# Patient Record
Sex: MALE | Race: OTHER | NOT HISPANIC OR LATINO | URBAN - METROPOLITAN AREA
[De-identification: names, ages, dates, MRNs, and addresses within clinical notes are randomized per-mention and may not be internally consistent; named-entity substitution may affect disease eponyms.]

---

## 2017-06-02 ENCOUNTER — EMERGENCY (EMERGENCY)
Facility: HOSPITAL | Age: 27
LOS: 1 days | Discharge: HOME | End: 2017-06-02
Admitting: EMERGENCY MEDICINE
Payer: COMMERCIAL

## 2017-06-02 VITALS
DIASTOLIC BLOOD PRESSURE: 86 MMHG | HEART RATE: 93 BPM | TEMPERATURE: 99 F | OXYGEN SATURATION: 97 % | RESPIRATION RATE: 18 BRPM | SYSTOLIC BLOOD PRESSURE: 164 MMHG

## 2017-06-02 PROCEDURE — 71120 X-RAY EXAM BREASTBONE 2/>VWS: CPT | Mod: 26

## 2017-06-02 PROCEDURE — 99284 EMERGENCY DEPT VISIT MOD MDM: CPT

## 2017-06-02 RX ORDER — DIAZEPAM 5 MG
1 TABLET ORAL
Qty: 8 | Refills: 0
Start: 2017-06-02

## 2017-06-02 RX ORDER — IBUPROFEN 200 MG
600 TABLET ORAL ONCE
Qty: 0 | Refills: 0 | Status: COMPLETED | OUTPATIENT
Start: 2017-06-02 | End: 2017-06-02

## 2017-06-02 RX ORDER — IBUPROFEN 200 MG
1 TABLET ORAL
Qty: 20 | Refills: 0
Start: 2017-06-02

## 2017-06-02 RX ADMIN — Medication 600 MILLIGRAM(S): at 21:51
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
## 2017-06-02 NOTE — ED PROVIDER NOTE - PLAN OF CARE
Follow up with your PMD within 48-72hrs. Take all of your medications as previously prescribed.  Apply warm compresses to your neck 2-3 times/day.  Light movements, no heavy lifting.  Take Motrin 600mg every 6-8 hrs with food for pain. Take Valium 5 mg 1 tab every 8 hrs only as needed for muscle spasm- caution drowsiness- do not drive.  You need to follow up with an Orthopedist Doctor outpatient for a possible MRI of your neck vs. Physical Therapy- referral list provided. Worsening or new weakness, pain, numbness, fever, chills or new concerning symptoms return to the Emergency Department.
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
## 2017-06-02 NOTE — ED PROVIDER NOTE - PROGRESS NOTE DETAILS
PA Tiberio- feeling better s/p motrin and valium. Xray sterum negative for acute fracture/displacement. Will DC home on Motrin with PMD follow up. The scribe's documentation has been prepared under my direction and personally reviewed by me in its entirety. I confirm that the note above accurately reflects all work, treatment, procedures, and medical decision making performed by me.  Kiara Samano PA-C

## 2017-06-02 NOTE — ED PROVIDER NOTE - MUSCULOSKELETAL MINIMAL EXAM
Mid sternal chest wall tenderness with palpation. mild mid sternal chest wall tenderness with palpation.

## 2021-01-24 ENCOUNTER — INPATIENT (INPATIENT)
Facility: HOSPITAL | Age: 31
LOS: 0 days | Discharge: HOME | End: 2021-01-25
Attending: SURGERY | Admitting: SURGERY
Payer: MEDICAID

## 2021-01-24 VITALS
RESPIRATION RATE: 20 BRPM | DIASTOLIC BLOOD PRESSURE: 97 MMHG | TEMPERATURE: 98 F | HEART RATE: 57 BPM | OXYGEN SATURATION: 100 % | SYSTOLIC BLOOD PRESSURE: 156 MMHG

## 2021-01-24 DIAGNOSIS — K81.9 CHOLECYSTITIS, UNSPECIFIED: ICD-10-CM

## 2021-01-24 LAB
ALBUMIN SERPL ELPH-MCNC: 5 G/DL — SIGNIFICANT CHANGE UP (ref 3.3–5)
ALP SERPL-CCNC: 69 U/L — SIGNIFICANT CHANGE UP (ref 40–120)
ALT FLD-CCNC: 38 U/L — SIGNIFICANT CHANGE UP (ref 4–41)
ANION GAP SERPL CALC-SCNC: 14 MMOL/L — SIGNIFICANT CHANGE UP (ref 7–14)
APTT BLD: 29.2 SEC — SIGNIFICANT CHANGE UP (ref 27–36.3)
AST SERPL-CCNC: 29 U/L — SIGNIFICANT CHANGE UP (ref 4–40)
BASOPHILS # BLD AUTO: 0.01 K/UL — SIGNIFICANT CHANGE UP (ref 0–0.2)
BASOPHILS NFR BLD AUTO: 0.1 % — SIGNIFICANT CHANGE UP (ref 0–2)
BILIRUB SERPL-MCNC: 0.4 MG/DL — SIGNIFICANT CHANGE UP (ref 0.2–1.2)
BLD GP AB SCN SERPL QL: NEGATIVE — SIGNIFICANT CHANGE UP
BUN SERPL-MCNC: 14 MG/DL — SIGNIFICANT CHANGE UP (ref 7–23)
CALCIUM SERPL-MCNC: 9.6 MG/DL — SIGNIFICANT CHANGE UP (ref 8.4–10.5)
CHLORIDE SERPL-SCNC: 103 MMOL/L — SIGNIFICANT CHANGE UP (ref 98–107)
CO2 SERPL-SCNC: 22 MMOL/L — SIGNIFICANT CHANGE UP (ref 22–31)
CREAT SERPL-MCNC: 0.92 MG/DL — SIGNIFICANT CHANGE UP (ref 0.5–1.3)
EOSINOPHIL # BLD AUTO: 0.02 K/UL — SIGNIFICANT CHANGE UP (ref 0–0.5)
EOSINOPHIL NFR BLD AUTO: 0.2 % — SIGNIFICANT CHANGE UP (ref 0–6)
GLUCOSE SERPL-MCNC: 134 MG/DL — HIGH (ref 70–99)
HCT VFR BLD CALC: 44.3 % — SIGNIFICANT CHANGE UP (ref 39–50)
HGB BLD-MCNC: 14.3 G/DL — SIGNIFICANT CHANGE UP (ref 13–17)
IANC: 9.14 K/UL — HIGH (ref 1.5–8.5)
IMM GRANULOCYTES NFR BLD AUTO: 0.3 % — SIGNIFICANT CHANGE UP (ref 0–1.5)
INR BLD: 1.03 RATIO — SIGNIFICANT CHANGE UP (ref 0.88–1.16)
LIDOCAIN IGE QN: 27 U/L — SIGNIFICANT CHANGE UP (ref 7–60)
LYMPHOCYTES # BLD AUTO: 0.96 K/UL — LOW (ref 1–3.3)
LYMPHOCYTES # BLD AUTO: 9 % — LOW (ref 13–44)
MCHC RBC-ENTMCNC: 28 PG — SIGNIFICANT CHANGE UP (ref 27–34)
MCHC RBC-ENTMCNC: 32.3 GM/DL — SIGNIFICANT CHANGE UP (ref 32–36)
MCV RBC AUTO: 86.7 FL — SIGNIFICANT CHANGE UP (ref 80–100)
MONOCYTES # BLD AUTO: 0.5 K/UL — SIGNIFICANT CHANGE UP (ref 0–0.9)
MONOCYTES NFR BLD AUTO: 4.7 % — SIGNIFICANT CHANGE UP (ref 2–14)
NEUTROPHILS # BLD AUTO: 9.14 K/UL — HIGH (ref 1.8–7.4)
NEUTROPHILS NFR BLD AUTO: 85.7 % — HIGH (ref 43–77)
NRBC # BLD: 0 /100 WBCS — SIGNIFICANT CHANGE UP
NRBC # FLD: 0 K/UL — SIGNIFICANT CHANGE UP
PLATELET # BLD AUTO: 224 K/UL — SIGNIFICANT CHANGE UP (ref 150–400)
POTASSIUM SERPL-MCNC: 3.4 MMOL/L — LOW (ref 3.5–5.3)
POTASSIUM SERPL-SCNC: 3.4 MMOL/L — LOW (ref 3.5–5.3)
PROT SERPL-MCNC: 7.4 G/DL — SIGNIFICANT CHANGE UP (ref 6–8.3)
PROTHROM AB SERPL-ACNC: 11.8 SEC — SIGNIFICANT CHANGE UP (ref 10.6–13.6)
RBC # BLD: 5.11 M/UL — SIGNIFICANT CHANGE UP (ref 4.2–5.8)
RBC # FLD: 12.7 % — SIGNIFICANT CHANGE UP (ref 10.3–14.5)
RH IG SCN BLD-IMP: POSITIVE — SIGNIFICANT CHANGE UP
SARS-COV-2 RNA SPEC QL NAA+PROBE: DETECTED
SODIUM SERPL-SCNC: 139 MMOL/L — SIGNIFICANT CHANGE UP (ref 135–145)
WBC # BLD: 10.66 K/UL — HIGH (ref 3.8–10.5)
WBC # FLD AUTO: 10.66 K/UL — HIGH (ref 3.8–10.5)

## 2021-01-24 PROCEDURE — 99222 1ST HOSP IP/OBS MODERATE 55: CPT | Mod: GC

## 2021-01-24 PROCEDURE — 93010 ELECTROCARDIOGRAM REPORT: CPT

## 2021-01-24 PROCEDURE — 76705 ECHO EXAM OF ABDOMEN: CPT | Mod: 26

## 2021-01-24 PROCEDURE — 99285 EMERGENCY DEPT VISIT HI MDM: CPT

## 2021-01-24 PROCEDURE — 99231 SBSQ HOSP IP/OBS SF/LOW 25: CPT

## 2021-01-24 RX ORDER — SODIUM CHLORIDE 9 MG/ML
1000 INJECTION INTRAMUSCULAR; INTRAVENOUS; SUBCUTANEOUS ONCE
Refills: 0 | Status: COMPLETED | OUTPATIENT
Start: 2021-01-24 | End: 2021-01-24

## 2021-01-24 RX ORDER — POTASSIUM CHLORIDE 20 MEQ
40 PACKET (EA) ORAL ONCE
Refills: 0 | Status: COMPLETED | OUTPATIENT
Start: 2021-01-24 | End: 2021-01-24

## 2021-01-24 RX ORDER — MORPHINE SULFATE 50 MG/1
4 CAPSULE, EXTENDED RELEASE ORAL ONCE
Refills: 0 | Status: DISCONTINUED | OUTPATIENT
Start: 2021-01-24 | End: 2021-01-24

## 2021-01-24 RX ORDER — METRONIDAZOLE 500 MG
500 TABLET ORAL EVERY 8 HOURS
Refills: 0 | Status: DISCONTINUED | OUTPATIENT
Start: 2021-01-24 | End: 2021-01-25

## 2021-01-24 RX ORDER — SODIUM CHLORIDE 9 MG/ML
1000 INJECTION, SOLUTION INTRAVENOUS
Refills: 0 | Status: DISCONTINUED | OUTPATIENT
Start: 2021-01-24 | End: 2021-01-25

## 2021-01-24 RX ORDER — ONDANSETRON 8 MG/1
4 TABLET, FILM COATED ORAL ONCE
Refills: 0 | Status: COMPLETED | OUTPATIENT
Start: 2021-01-24 | End: 2021-01-24

## 2021-01-24 RX ORDER — HYDROMORPHONE HYDROCHLORIDE 2 MG/ML
1 INJECTION INTRAMUSCULAR; INTRAVENOUS; SUBCUTANEOUS EVERY 4 HOURS
Refills: 0 | Status: DISCONTINUED | OUTPATIENT
Start: 2021-01-24 | End: 2021-01-25

## 2021-01-24 RX ORDER — METRONIDAZOLE 500 MG
500 TABLET ORAL ONCE
Refills: 0 | Status: COMPLETED | OUTPATIENT
Start: 2021-01-24 | End: 2021-01-24

## 2021-01-24 RX ORDER — SODIUM CHLORIDE 9 MG/ML
1000 INJECTION, SOLUTION INTRAVENOUS ONCE
Refills: 0 | Status: COMPLETED | OUTPATIENT
Start: 2021-01-24 | End: 2021-01-24

## 2021-01-24 RX ORDER — ACETAMINOPHEN 500 MG
975 TABLET ORAL EVERY 6 HOURS
Refills: 0 | Status: DISCONTINUED | OUTPATIENT
Start: 2021-01-24 | End: 2021-01-25

## 2021-01-24 RX ORDER — HYDROMORPHONE HYDROCHLORIDE 2 MG/ML
0.5 INJECTION INTRAMUSCULAR; INTRAVENOUS; SUBCUTANEOUS EVERY 4 HOURS
Refills: 0 | Status: DISCONTINUED | OUTPATIENT
Start: 2021-01-24 | End: 2021-01-25

## 2021-01-24 RX ORDER — ENOXAPARIN SODIUM 100 MG/ML
40 INJECTION SUBCUTANEOUS DAILY
Refills: 0 | Status: DISCONTINUED | OUTPATIENT
Start: 2021-01-24 | End: 2021-01-25

## 2021-01-24 RX ORDER — CEFTRIAXONE 500 MG/1
1000 INJECTION, POWDER, FOR SOLUTION INTRAMUSCULAR; INTRAVENOUS EVERY 24 HOURS
Refills: 0 | Status: DISCONTINUED | OUTPATIENT
Start: 2021-01-24 | End: 2021-01-25

## 2021-01-24 RX ORDER — METRONIDAZOLE 500 MG
TABLET ORAL
Refills: 0 | Status: DISCONTINUED | OUTPATIENT
Start: 2021-01-24 | End: 2021-01-25

## 2021-01-24 RX ADMIN — Medication 40 MILLIEQUIVALENT(S): at 13:26

## 2021-01-24 RX ADMIN — SODIUM CHLORIDE 1000 MILLILITER(S): 9 INJECTION INTRAMUSCULAR; INTRAVENOUS; SUBCUTANEOUS at 11:44

## 2021-01-24 RX ADMIN — Medication 100 MILLIGRAM(S): at 22:41

## 2021-01-24 RX ADMIN — CEFTRIAXONE 100 MILLIGRAM(S): 500 INJECTION, POWDER, FOR SOLUTION INTRAMUSCULAR; INTRAVENOUS at 15:40

## 2021-01-24 RX ADMIN — SODIUM CHLORIDE 1000 MILLILITER(S): 9 INJECTION, SOLUTION INTRAVENOUS at 15:37

## 2021-01-24 RX ADMIN — MORPHINE SULFATE 4 MILLIGRAM(S): 50 CAPSULE, EXTENDED RELEASE ORAL at 13:51

## 2021-01-24 RX ADMIN — SODIUM CHLORIDE 125 MILLILITER(S): 9 INJECTION, SOLUTION INTRAVENOUS at 17:25

## 2021-01-24 RX ADMIN — ONDANSETRON 4 MILLIGRAM(S): 8 TABLET, FILM COATED ORAL at 18:19

## 2021-01-24 RX ADMIN — MORPHINE SULFATE 4 MILLIGRAM(S): 50 CAPSULE, EXTENDED RELEASE ORAL at 11:43

## 2021-01-24 RX ADMIN — HYDROMORPHONE HYDROCHLORIDE 1 MILLIGRAM(S): 2 INJECTION INTRAMUSCULAR; INTRAVENOUS; SUBCUTANEOUS at 16:08

## 2021-01-24 RX ADMIN — Medication 100 MILLIGRAM(S): at 16:56

## 2021-01-24 RX ADMIN — ENOXAPARIN SODIUM 40 MILLIGRAM(S): 100 INJECTION SUBCUTANEOUS at 19:30

## 2021-01-24 NOTE — H&P ADULT - ATTENDING COMMENTS
Patient has tested positive for COVID.  Given equivocal findings for acute cholecystitis on ultrasound, will defer operative management at present time.

## 2021-01-24 NOTE — ED PROVIDER NOTE - OBJECTIVE STATEMENT
29yo M no PMHx p/w abd pain onset 1AM, constant in nature, radiation to the back. Took two unknown doses of Advil at night without relief of sxs. Has a history of cholelithiasis. No N/V/D. Last ate at 11pm. No diarrhea. Last BM this AM, nonbloody, nonmelanotic. No fevers, chills or night sweats. No prior surgeries or medical issues. No meds or alcohol use.

## 2021-01-24 NOTE — H&P ADULT - HISTORY OF PRESENT ILLNESS
30M hx biliary colic otherwise healthy presents with 1 day of abdominal pain. Patient reports he has known gallstones and biliary colic for 3 years but never saw a surgeon. Yesterday he had a rich meal and woke up at 1 am with excruciating abdominal pain which worsened. At 3 am patient vomited. Due to worsening abdominal pain, he presented to the ED for further evaluation. Denies fevers/chills, prior surgeries.

## 2021-01-24 NOTE — ED ADULT NURSE REASSESSMENT NOTE - NS ED NURSE REASSESS COMMENT FT1
Patient awake and alert, c/o nausea when attempting to get up to go to the BR. Zofran given as ordered, will continue to monitor. Patient otherwise resting in no distress.

## 2021-01-24 NOTE — ED PROVIDER NOTE - PHYSICAL EXAMINATION
CONSTITUTIONAL: Writhing in pain, punching the wall, screaming for pain medications   EYES: EOMI, conjunctiva and sclera clear  ENMT: MMM, no pharyngeal injection or exudates   NECK: Supple   RESPIRATORY: Normal respiratory effort, CTAB, no wheezes, rales, or rhonchi  CARDIOVASCULAR: RRR, normal S1 and S2, no MRG, distal pulses 2+ bilaterally,no peripheral edema  ABDOMEN: Soft, non-distended, TTP over RUQ and epigastric region, no rebound/guarding  MUSCULOSKELETAL: No joint swelling or tenderness  NEURO: following commands, moving all extremities spontaneously  PSYCH: appropriate affect   SKIN: No rashes; no palpable lesions

## 2021-01-24 NOTE — ED PROVIDER NOTE - CLINICAL SUMMARY MEDICAL DECISION MAKING FREE TEXT BOX
31yo M PMHx cholelithiasis p/w abd pain onset 1AM, constant in nature, radiation to the back c/f cholecystitis vs choledocholithiasis vs pancreatitis. Vitals wnl. Will obtain RUQ US, basic blood work, lipase, give IVF, morphine and reassess.

## 2021-01-24 NOTE — ED ADULT NURSE REASSESSMENT NOTE - NS ED NURSE REASSESS COMMENT FT1
Pt being medicated per MD orders, pt admitted under surgery at this time. Pt rates a decrease in pain 4/4. Respirations non-labored. Pt with 20 G IV to b/l AC. Site is clean dry and intact. Safety being maintained. Will continue to monitor.

## 2021-01-24 NOTE — ED PROVIDER NOTE - ATTENDING CONTRIBUTION TO CARE
Bonifacio Bains Attending Physician: Agree w/ above written by resident unless explicitly outline here. 31 y/o male hx of gall stones, last US 5 years ago, presents w/ few hours of right upper quadrant pain. Per patient, been having pain since 1 am this morning - pain in epigastric/RUQ area, radiating to the back - feels like his prior gall stone pain - no dysuria, hematuria - no hx of renal colic - no testicular pain - patient took 2x aleve w/ mild relief of pain - nausea w/ 1x vomiting today - on exam, patient very uncomfortable - abd is soft, not peritoneal, w/ positive murphys and tenderness w/ some guarding in RUQ - no CVAT - given presentation, concern for acute jeramy vs symptomatic cholelithiasis - no fevers, not a drinker, but does smoke marijuana - will obtain labs, lipase, US, pain control, and dispo accordingly.

## 2021-01-24 NOTE — ED ADULT TRIAGE NOTE - CHIEF COMPLAINT QUOTE
pt coming with Epigastric pain started last night, + nausea, pt stated was Dx. with Gall stones in the past,

## 2021-01-24 NOTE — H&P ADULT - NSHPPHYSICALEXAM_GEN_ALL_CORE
NAD, resting in stretcher  Alert, responding appropriately  Non labored respirations  Soft, RUQ tenderness, ND, voluntary guarding, +Ramirez's  WWP

## 2021-01-24 NOTE — H&P ADULT - NSHPLABSRESULTS_GEN_ALL_CORE
14.3   10.66 )-----------( 224      ( 24 Jan 2021 12:18 )             44.3     01-24    139  |  103  |  14  ----------------------------<  134<H>  3.4<L>   |  22  |  0.92    Ca    9.6      24 Jan 2021 12:18    TPro  7.4  /  Alb  5.0  /  TBili  0.4  /  DBili  x   /  AST  29  /  ALT  38  /  AlkPhos  69  01-24    < from: US Abdomen Limited (01.24.21 @ 12:59) >      EXAM:  US ABDOMEN LIMITED        PROCEDURE DATE:  Jan 24 2021         INTERPRETATION:  CLINICAL INFORMATION: Right upper quadrant pain    COMPARISON: Sonogram abdomen from 10/10/2016.    TECHNIQUE: Sonography of the right upper quadrant.    FINDINGS:    Liver: Slightly increased echogenicity may suggest hepatic steatosis.  Bile ducts: Normal caliber. Common bile duct measures 3 mm.  Gallbladder: Multiple gallstones, some of which are nonmobile and located in the neck. Gallbladder wall measures approximately 3 mm. No pericholecystic fluid. Positive sonographic Ramirez sign.  Pancreas: Visualized portions are within normal limits.  Right kidney: 11.9 cm. No hydronephrosis.  Ascites: None.  Aorta, IVC: Visualized portions are within normal limits.    IMPRESSION:  Cholelithiasis and associated positive sonographic Ramirez's sign, without significant wall thickening or pericholecystic fluid. Findings are suspicious, but not diagnostic, for acute cholecystitis. Further evaluation may be obtained with HIDA nuclear medicine scan.    SANTIAGO VAZQUEZ MD; Resident Radiology  This document has been electronically signed.  CHELSEY SEALS MD; Attending Radiologist  This document has been electronically signed. Jan 24 2021  1:01PM    < end of copied text >

## 2021-01-24 NOTE — H&P ADULT - ASSESSMENT
30M hx biliary colic now presenting with 1 day of abdominal pain found to have acute cholecystitis    - Admit to Surgery under Dr. Urbina  - Booked and consented for laparoscopic cholecystectomy  - NPO / IV hydration / Ceftriaxone and Flagyl  - Preoperative studies  - Pain control as needed  - VTE ppx: Lovenox, ambulation    d/w Dr. Carlitos Brady, PGY-3  Acute Care Surgery (B Team)  [id number] with questions

## 2021-01-25 ENCOUNTER — TRANSCRIPTION ENCOUNTER (OUTPATIENT)
Age: 31
End: 2021-01-25

## 2021-01-25 VITALS
TEMPERATURE: 98 F | SYSTOLIC BLOOD PRESSURE: 145 MMHG | OXYGEN SATURATION: 99 % | DIASTOLIC BLOOD PRESSURE: 82 MMHG | HEART RATE: 75 BPM | RESPIRATION RATE: 18 BRPM

## 2021-01-25 LAB
ANION GAP SERPL CALC-SCNC: 11 MMOL/L — SIGNIFICANT CHANGE UP (ref 7–14)
BUN SERPL-MCNC: 9 MG/DL — SIGNIFICANT CHANGE UP (ref 7–23)
CALCIUM SERPL-MCNC: 9.1 MG/DL — SIGNIFICANT CHANGE UP (ref 8.4–10.5)
CHLORIDE SERPL-SCNC: 106 MMOL/L — SIGNIFICANT CHANGE UP (ref 98–107)
CO2 SERPL-SCNC: 25 MMOL/L — SIGNIFICANT CHANGE UP (ref 22–31)
CREAT SERPL-MCNC: 0.93 MG/DL — SIGNIFICANT CHANGE UP (ref 0.5–1.3)
GLUCOSE SERPL-MCNC: 102 MG/DL — HIGH (ref 70–99)
HCT VFR BLD CALC: 44.5 % — SIGNIFICANT CHANGE UP (ref 39–50)
HGB BLD-MCNC: 14.4 G/DL — SIGNIFICANT CHANGE UP (ref 13–17)
MAGNESIUM SERPL-MCNC: 2.2 MG/DL — SIGNIFICANT CHANGE UP (ref 1.6–2.6)
MCHC RBC-ENTMCNC: 28.2 PG — SIGNIFICANT CHANGE UP (ref 27–34)
MCHC RBC-ENTMCNC: 32.4 GM/DL — SIGNIFICANT CHANGE UP (ref 32–36)
MCV RBC AUTO: 87.1 FL — SIGNIFICANT CHANGE UP (ref 80–100)
NRBC # BLD: 0 /100 WBCS — SIGNIFICANT CHANGE UP
NRBC # FLD: 0 K/UL — SIGNIFICANT CHANGE UP
PHOSPHATE SERPL-MCNC: 2.4 MG/DL — LOW (ref 2.5–4.5)
PLATELET # BLD AUTO: 212 K/UL — SIGNIFICANT CHANGE UP (ref 150–400)
POTASSIUM SERPL-MCNC: 4.1 MMOL/L — SIGNIFICANT CHANGE UP (ref 3.5–5.3)
POTASSIUM SERPL-SCNC: 4.1 MMOL/L — SIGNIFICANT CHANGE UP (ref 3.5–5.3)
RBC # BLD: 5.11 M/UL — SIGNIFICANT CHANGE UP (ref 4.2–5.8)
RBC # FLD: 12.8 % — SIGNIFICANT CHANGE UP (ref 10.3–14.5)
SODIUM SERPL-SCNC: 142 MMOL/L — SIGNIFICANT CHANGE UP (ref 135–145)
WBC # BLD: 8.75 K/UL — SIGNIFICANT CHANGE UP (ref 3.8–10.5)
WBC # FLD AUTO: 8.75 K/UL — SIGNIFICANT CHANGE UP (ref 3.8–10.5)

## 2021-01-25 RX ORDER — OXYCODONE HYDROCHLORIDE 5 MG/1
5 TABLET ORAL EVERY 4 HOURS
Refills: 0 | Status: DISCONTINUED | OUTPATIENT
Start: 2021-01-25 | End: 2021-01-25

## 2021-01-25 RX ORDER — ACETAMINOPHEN 500 MG
2 TABLET ORAL
Qty: 0 | Refills: 0 | DISCHARGE
Start: 2021-01-25

## 2021-01-25 RX ORDER — OXYCODONE HYDROCHLORIDE 5 MG/1
1 TABLET ORAL
Qty: 8 | Refills: 0
Start: 2021-01-25 | End: 2021-01-26

## 2021-01-25 RX ORDER — INFLUENZA VIRUS VACCINE 15; 15; 15; 15 UG/.5ML; UG/.5ML; UG/.5ML; UG/.5ML
0.5 SUSPENSION INTRAMUSCULAR ONCE
Refills: 0 | Status: DISCONTINUED | OUTPATIENT
Start: 2021-01-25 | End: 2021-01-25

## 2021-01-25 RX ORDER — ACETAMINOPHEN 500 MG
3 TABLET ORAL
Qty: 0 | Refills: 0 | DISCHARGE
Start: 2021-01-25

## 2021-01-25 RX ORDER — OXYCODONE HYDROCHLORIDE 5 MG/1
10 TABLET ORAL EVERY 4 HOURS
Refills: 0 | Status: DISCONTINUED | OUTPATIENT
Start: 2021-01-25 | End: 2021-01-25

## 2021-01-25 RX ADMIN — SODIUM CHLORIDE 125 MILLILITER(S): 9 INJECTION, SOLUTION INTRAVENOUS at 00:16

## 2021-01-25 RX ADMIN — CEFTRIAXONE 100 MILLIGRAM(S): 500 INJECTION, POWDER, FOR SOLUTION INTRAMUSCULAR; INTRAVENOUS at 15:12

## 2021-01-25 RX ADMIN — HYDROMORPHONE HYDROCHLORIDE 0.5 MILLIGRAM(S): 2 INJECTION INTRAMUSCULAR; INTRAVENOUS; SUBCUTANEOUS at 00:16

## 2021-01-25 RX ADMIN — Medication 100 MILLIGRAM(S): at 13:26

## 2021-01-25 RX ADMIN — SODIUM CHLORIDE 125 MILLILITER(S): 9 INJECTION, SOLUTION INTRAVENOUS at 07:38

## 2021-01-25 RX ADMIN — Medication 100 MILLIGRAM(S): at 07:37

## 2021-01-25 RX ADMIN — ENOXAPARIN SODIUM 40 MILLIGRAM(S): 100 INJECTION SUBCUTANEOUS at 13:26

## 2021-01-25 NOTE — PROGRESS NOTE ADULT - SUBJECTIVE AND OBJECTIVE BOX
B TEAM SURGERY AM PROGRESS NOTE:    SUBJECTIVE:  Pt seen and examined on AM rounds. No acute events overnight. Pt COVID +. Denies abdominal pain, N/V, fever, chills, SOB, chest pain.       MEDICATIONS  (STANDING):  cefTRIAXone   IVPB 1000 milliGRAM(s) IV Intermittent every 24 hours  enoxaparin Injectable 40 milliGRAM(s) SubCutaneous daily  lactated ringers. 1000 milliLiter(s) (125 mL/Hr) IV Continuous <Continuous>  metroNIDAZOLE  IVPB 500 milliGRAM(s) IV Intermittent every 8 hours  metroNIDAZOLE  IVPB        MEDICATIONS  (PRN):  acetaminophen   Tablet .. 975 milliGRAM(s) Oral every 6 hours PRN Mild Pain (1 - 3)  oxyCODONE    IR 5 milliGRAM(s) Oral every 4 hours PRN Moderate Pain (4 - 6)  oxyCODONE    IR 10 milliGRAM(s) Oral every 4 hours PRN Severe Pain (7 - 10)      Vital Signs Last 24 Hrs  T(C): 37.2 (25 Jan 2021 00:03), Max: 37.2 (25 Jan 2021 00:03)  T(F): 98.9 (25 Jan 2021 00:03), Max: 98.9 (25 Jan 2021 00:03)  HR: 66 (25 Jan 2021 00:03) (54 - 68)  BP: 132/65 (25 Jan 2021 00:03) (130/81 - 156/97)  BP(mean): --  RR: 20 (25 Jan 2021 00:03) (17 - 20)  SpO2: 97% (25 Jan 2021 00:03) (97% - 100%)    EXAM:  General: A&Ox3, NAD.  Respiratory: unlabored breathing.   CVS: Regular rate and rhythm  Abdomen: Soft, non-distended, non-tender.  Extremities: Warm bilaterally w/ palpable pulses.   MSK: Intact ROM.      LABS:                        14.3   10.66 )-----------( 224      ( 24 Jan 2021 12:18 )             44.3     01-24    139  |  103  |  14  ----------------------------<  134<H>  3.4<L>   |  22  |  0.92    Ca    9.6      24 Jan 2021 12:18    TPro  7.4  /  Alb  5.0  /  TBili  0.4  /  DBili  x   /  AST  29  /  ALT  38  /  AlkPhos  69  01-24    PT/INR - ( 24 Jan 2021 16:59 )   PT: 11.8 sec;   INR: 1.03 ratio         PTT - ( 24 Jan 2021 16:59 )  PTT:29.2 sec

## 2021-01-25 NOTE — PROGRESS NOTE ADULT - ASSESSMENT
ASSESSMENT:  30M hx biliary colic now presenting with 1 day of abdominal pain found to have acute cholecystitis    PLAN:  - Advance to CLD; if tolerated without abdominal pain will give regular Diet for lunch   - continue IV ABX   - Pain control as needed  - VTE ppx: Lovenox, ambulation  -Dispo: will d/c home today vs tomorrow if tolerating diet with outpatient follow up for interval cholecystectomy     [id number]  B Team Surgery

## 2021-01-25 NOTE — DISCHARGE NOTE PROVIDER - HOSPITAL COURSE
31 y/o male with a PMHx of biliary colic presents with abdominal pain and sonographic evidence of acute cholecystitis managed nonoperatively with IV antibiotics. The patient tolerated the procedure well. Post-operatively the patient was sent to the PACU. The patient was hemodynamically stable and was transferred to a surgical floor.  The patient's pain was controlled by IV pain medications and then by PO pain medications. The patient was advanced to a regular diet and tolerated it well. The patient was placed on home medications. At the time of discharge, the patient was hemodynamically stable, was tolerating PO diet, was voiding urine and passing stool, was ambulating, and was comfortable with adequate pain control. The patient was instructed to follow up with Dr. Urbina within 2 weeks after discharge from the hospital. The patient/family felt comfortable with discharge. The patient had no other issues. 31 y/o male with a PMHx of biliary colic presents with abdominal pain and sonographic evidence of acute cholecystitis managed nonoperatively with IV antibiotics due to his positive COVID status. At the time of discharge, the patient was hemodynamically stable, was tolerating PO diet, was voiding urine and passing stool, was ambulating, and was comfortable with adequate pain control. The patient was instructed to follow up with Dr. Urbina within 2 weeks after discharge from the hospital. The patient/family felt comfortable with discharge. The patient had no other issues. He was discharged with PO pain medications and a 14 day total course of antibiotics.

## 2021-01-25 NOTE — DISCHARGE NOTE PROVIDER - NSDCCPCAREPLAN_GEN_ALL_CORE_FT
PRINCIPAL DISCHARGE DIAGNOSIS  Diagnosis: Cholecystitis  Assessment and Plan of Treatment: You were treated with IV antibiotics. Oral antibiotics were sent to your pharmacy. Please continue these antibiotics as prescribed.  ACTIVITY: No heavy lifting or straining. Otherwise, you may return to your usual level of physical activity. If you are taking narcotic pain medication DO NOT drive a car, operate machinery or make important decisions.  DIET: Return to a low fat diet.  NOTIFY YOUR SURGEON IF YOU HAVE: any fever (over 100.4 F) persistent nausea/vomiting, or if your pain is not controlled on your discharge pain medications, unable to urinate.  Please follow up with your primary care physician in one week regarding your hospitalization, bring copies of your discharge paperwork.  Please follow up with your surgeon, Dr. Urbina in 1 weeks.       PRINCIPAL DISCHARGE DIAGNOSIS  Diagnosis: Cholecystitis  Assessment and Plan of Treatment: You were treated with IV antibiotics. Oral antibiotics were sent to your pharmacy. Please continue these antibiotics as prescribed.  ACTIVITY: No heavy lifting or straining. Otherwise, you may return to your usual level of physical activity. If you are taking narcotic pain medication DO NOT drive a car, operate machinery or make important decisions.  DIET: Return to a low fat diet.  NOTIFY YOUR SURGEON IF YOU HAVE: any fever (over 100.4 F) persistent nausea/vomiting, or if your pain is not controlled on your discharge pain medications, unable to urinate.  Please follow up with your primary care physician in one week regarding your hospitalization, bring copies of your discharge paperwork.  Please follow up with your surgeon, Dr. Urbina in 1-2 weeks regarding surgical planning. [phone]

## 2021-01-25 NOTE — DISCHARGE NOTE PROVIDER - NSDCMRMEDTOKEN_GEN_ALL_CORE_FT
acetaminophen 325 mg oral tablet: 3 tab(s) orally every 6 hours, As needed, Mild Pain (1 - 3)  amoxicillin-clavulanate 875 mg-125 mg oral tablet: 1 tab(s) orally 2 times a day MDD:2

## 2021-01-25 NOTE — DISCHARGE NOTE PROVIDER - CARE PROVIDER_API CALL
Adelina Urbina)  Surgery; Surgical Critical Care  [id number] [address] [place] [address]  Phone: [phone]  Fax: [phone]  Follow Up Time: 1 week

## 2021-01-25 NOTE — DISCHARGE NOTE NURSING/CASE MANAGEMENT/SOCIAL WORK - PATIENT PORTAL LINK FT
You can access the FollowMyHealth Patient Portal offered by [facility] by registering at the following website: [email or web address]. By joining [employer] [facility]’s FollowMyHealth portal, you will also be able to view your health information using other applications (apps) compatible with our system.

## 2021-01-26 ENCOUNTER — TRANSCRIPTION ENCOUNTER (OUTPATIENT)
Age: 31
End: 2021-01-26

## 2021-02-05 ENCOUNTER — TRANSCRIPTION ENCOUNTER (OUTPATIENT)
Age: 31
End: 2021-02-05

## 2021-07-13 ENCOUNTER — INPATIENT (INPATIENT)
Facility: HOSPITAL | Age: 31
LOS: 2 days | Discharge: HOME | End: 2021-07-16
Attending: SPECIALIST | Admitting: SPECIALIST
Payer: MEDICAID

## 2021-07-13 ENCOUNTER — TRANSCRIPTION ENCOUNTER (OUTPATIENT)
Age: 31
End: 2021-07-13

## 2021-07-13 VITALS
DIASTOLIC BLOOD PRESSURE: 113 MMHG | RESPIRATION RATE: 16 BRPM | TEMPERATURE: 98 F | SYSTOLIC BLOOD PRESSURE: 155 MMHG | HEIGHT: 72 IN | HEART RATE: 67 BPM | OXYGEN SATURATION: 100 %

## 2021-07-13 DIAGNOSIS — K81.9 CHOLECYSTITIS, UNSPECIFIED: ICD-10-CM

## 2021-07-13 DIAGNOSIS — Z98.890 OTHER SPECIFIED POSTPROCEDURAL STATES: Chronic | ICD-10-CM

## 2021-07-13 LAB
ALBUMIN SERPL ELPH-MCNC: 4.5 G/DL — SIGNIFICANT CHANGE UP (ref 3.3–5)
ALP SERPL-CCNC: 67 U/L — SIGNIFICANT CHANGE UP (ref 40–120)
ALT FLD-CCNC: 27 U/L — SIGNIFICANT CHANGE UP (ref 4–41)
AMYLASE P1 CFR SERPL: 29 U/L — SIGNIFICANT CHANGE UP (ref 25–125)
ANION GAP SERPL CALC-SCNC: 17 MMOL/L — HIGH (ref 7–14)
APTT BLD: 28.5 SEC — SIGNIFICANT CHANGE UP (ref 27–36.3)
AST SERPL-CCNC: 38 U/L — SIGNIFICANT CHANGE UP (ref 4–40)
B PERT DNA SPEC QL NAA+PROBE: SIGNIFICANT CHANGE UP
BILIRUB SERPL-MCNC: <0.2 MG/DL — SIGNIFICANT CHANGE UP (ref 0.2–1.2)
BLD GP AB SCN SERPL QL: NEGATIVE — SIGNIFICANT CHANGE UP
BLOOD GAS VENOUS COMPREHENSIVE RESULT: SIGNIFICANT CHANGE UP
BLOOD GAS VENOUS COMPREHENSIVE RESULT: SIGNIFICANT CHANGE UP
BUN SERPL-MCNC: 18 MG/DL — SIGNIFICANT CHANGE UP (ref 7–23)
C PNEUM DNA SPEC QL NAA+PROBE: SIGNIFICANT CHANGE UP
CALCIUM SERPL-MCNC: 9.5 MG/DL — SIGNIFICANT CHANGE UP (ref 8.4–10.5)
CHLORIDE SERPL-SCNC: 105 MMOL/L — SIGNIFICANT CHANGE UP (ref 98–107)
CO2 SERPL-SCNC: 20 MMOL/L — LOW (ref 22–31)
CREAT SERPL-MCNC: 0.89 MG/DL — SIGNIFICANT CHANGE UP (ref 0.5–1.3)
FLUAV SUBTYP SPEC NAA+PROBE: SIGNIFICANT CHANGE UP
FLUBV RNA SPEC QL NAA+PROBE: SIGNIFICANT CHANGE UP
GLUCOSE SERPL-MCNC: 114 MG/DL — HIGH (ref 70–99)
HADV DNA SPEC QL NAA+PROBE: SIGNIFICANT CHANGE UP
HCOV 229E RNA SPEC QL NAA+PROBE: SIGNIFICANT CHANGE UP
HCOV HKU1 RNA SPEC QL NAA+PROBE: SIGNIFICANT CHANGE UP
HCOV NL63 RNA SPEC QL NAA+PROBE: SIGNIFICANT CHANGE UP
HCOV OC43 RNA SPEC QL NAA+PROBE: SIGNIFICANT CHANGE UP
HCT VFR BLD CALC: 43.8 % — SIGNIFICANT CHANGE UP (ref 39–50)
HGB BLD-MCNC: 14.4 G/DL — SIGNIFICANT CHANGE UP (ref 13–17)
HMPV RNA SPEC QL NAA+PROBE: SIGNIFICANT CHANGE UP
HPIV1 RNA SPEC QL NAA+PROBE: SIGNIFICANT CHANGE UP
HPIV2 RNA SPEC QL NAA+PROBE: SIGNIFICANT CHANGE UP
HPIV3 RNA SPEC QL NAA+PROBE: SIGNIFICANT CHANGE UP
HPIV4 RNA SPEC QL NAA+PROBE: SIGNIFICANT CHANGE UP
INR BLD: 0.94 RATIO — SIGNIFICANT CHANGE UP (ref 0.88–1.16)
LIDOCAIN IGE QN: 36 U/L — SIGNIFICANT CHANGE UP (ref 7–60)
MAGNESIUM SERPL-MCNC: 2.2 MG/DL — SIGNIFICANT CHANGE UP (ref 1.6–2.6)
MCHC RBC-ENTMCNC: 29.7 PG — SIGNIFICANT CHANGE UP (ref 27–34)
MCHC RBC-ENTMCNC: 32.9 GM/DL — SIGNIFICANT CHANGE UP (ref 32–36)
MCV RBC AUTO: 90.3 FL — SIGNIFICANT CHANGE UP (ref 80–100)
NRBC # BLD: 0 /100 WBCS — SIGNIFICANT CHANGE UP
NRBC # FLD: 0 K/UL — SIGNIFICANT CHANGE UP
PHOSPHATE SERPL-MCNC: 3.5 MG/DL — SIGNIFICANT CHANGE UP (ref 2.5–4.5)
PLATELET # BLD AUTO: 220 K/UL — SIGNIFICANT CHANGE UP (ref 150–400)
POTASSIUM SERPL-MCNC: 3.7 MMOL/L — SIGNIFICANT CHANGE UP (ref 3.5–5.3)
POTASSIUM SERPL-SCNC: 3.7 MMOL/L — SIGNIFICANT CHANGE UP (ref 3.5–5.3)
PROT SERPL-MCNC: 6.8 G/DL — SIGNIFICANT CHANGE UP (ref 6–8.3)
PROTHROM AB SERPL-ACNC: 10.7 SEC — SIGNIFICANT CHANGE UP (ref 10.6–13.6)
RAPID RVP RESULT: SIGNIFICANT CHANGE UP
RBC # BLD: 4.85 M/UL — SIGNIFICANT CHANGE UP (ref 4.2–5.8)
RBC # FLD: 12.5 % — SIGNIFICANT CHANGE UP (ref 10.3–14.5)
RH IG SCN BLD-IMP: POSITIVE — SIGNIFICANT CHANGE UP
RSV RNA SPEC QL NAA+PROBE: SIGNIFICANT CHANGE UP
RV+EV RNA SPEC QL NAA+PROBE: SIGNIFICANT CHANGE UP
SARS-COV-2 RNA SPEC QL NAA+PROBE: SIGNIFICANT CHANGE UP
SODIUM SERPL-SCNC: 142 MMOL/L — SIGNIFICANT CHANGE UP (ref 135–145)
TROPONIN T, HIGH SENSITIVITY RESULT: <6 NG/L — SIGNIFICANT CHANGE UP
WBC # BLD: 10.27 K/UL — SIGNIFICANT CHANGE UP (ref 3.8–10.5)
WBC # FLD AUTO: 10.27 K/UL — SIGNIFICANT CHANGE UP (ref 3.8–10.5)

## 2021-07-13 PROCEDURE — 99285 EMERGENCY DEPT VISIT HI MDM: CPT

## 2021-07-13 PROCEDURE — 76700 US EXAM ABDOM COMPLETE: CPT | Mod: 26

## 2021-07-13 PROCEDURE — 71045 X-RAY EXAM CHEST 1 VIEW: CPT | Mod: 26

## 2021-07-13 RX ORDER — HEPARIN SODIUM 5000 [USP'U]/ML
5000 INJECTION INTRAVENOUS; SUBCUTANEOUS EVERY 8 HOURS
Refills: 0 | Status: DISCONTINUED | OUTPATIENT
Start: 2021-07-13 | End: 2021-07-16

## 2021-07-13 RX ORDER — PIPERACILLIN AND TAZOBACTAM 4; .5 G/20ML; G/20ML
3.38 INJECTION, POWDER, LYOPHILIZED, FOR SOLUTION INTRAVENOUS ONCE
Refills: 0 | Status: DISCONTINUED | OUTPATIENT
Start: 2021-07-13 | End: 2021-07-13

## 2021-07-13 RX ORDER — HYDROMORPHONE HYDROCHLORIDE 2 MG/ML
0.5 INJECTION INTRAMUSCULAR; INTRAVENOUS; SUBCUTANEOUS EVERY 4 HOURS
Refills: 0 | Status: DISCONTINUED | OUTPATIENT
Start: 2021-07-13 | End: 2021-07-15

## 2021-07-13 RX ORDER — FAMOTIDINE 10 MG/ML
20 INJECTION INTRAVENOUS ONCE
Refills: 0 | Status: COMPLETED | OUTPATIENT
Start: 2021-07-13 | End: 2021-07-13

## 2021-07-13 RX ORDER — METOCLOPRAMIDE HCL 10 MG
10 TABLET ORAL ONCE
Refills: 0 | Status: COMPLETED | OUTPATIENT
Start: 2021-07-13 | End: 2021-07-13

## 2021-07-13 RX ORDER — MORPHINE SULFATE 50 MG/1
2 CAPSULE, EXTENDED RELEASE ORAL ONCE
Refills: 0 | Status: DISCONTINUED | OUTPATIENT
Start: 2021-07-13 | End: 2021-07-13

## 2021-07-13 RX ORDER — ONDANSETRON 8 MG/1
4 TABLET, FILM COATED ORAL ONCE
Refills: 0 | Status: COMPLETED | OUTPATIENT
Start: 2021-07-13 | End: 2021-07-13

## 2021-07-13 RX ORDER — HYDROMORPHONE HYDROCHLORIDE 2 MG/ML
1 INJECTION INTRAMUSCULAR; INTRAVENOUS; SUBCUTANEOUS EVERY 4 HOURS
Refills: 0 | Status: DISCONTINUED | OUTPATIENT
Start: 2021-07-13 | End: 2021-07-15

## 2021-07-13 RX ORDER — MORPHINE SULFATE 50 MG/1
4 CAPSULE, EXTENDED RELEASE ORAL ONCE
Refills: 0 | Status: DISCONTINUED | OUTPATIENT
Start: 2021-07-13 | End: 2021-07-13

## 2021-07-13 RX ORDER — PIPERACILLIN AND TAZOBACTAM 4; .5 G/20ML; G/20ML
3.38 INJECTION, POWDER, LYOPHILIZED, FOR SOLUTION INTRAVENOUS ONCE
Refills: 0 | Status: COMPLETED | OUTPATIENT
Start: 2021-07-13 | End: 2021-07-13

## 2021-07-13 RX ORDER — SODIUM CHLORIDE 9 MG/ML
1000 INJECTION INTRAMUSCULAR; INTRAVENOUS; SUBCUTANEOUS ONCE
Refills: 0 | Status: COMPLETED | OUTPATIENT
Start: 2021-07-13 | End: 2021-07-13

## 2021-07-13 RX ORDER — HYDROMORPHONE HYDROCHLORIDE 2 MG/ML
1 INJECTION INTRAMUSCULAR; INTRAVENOUS; SUBCUTANEOUS ONCE
Refills: 0 | Status: DISCONTINUED | OUTPATIENT
Start: 2021-07-13 | End: 2021-07-13

## 2021-07-13 RX ORDER — PIPERACILLIN AND TAZOBACTAM 4; .5 G/20ML; G/20ML
3.38 INJECTION, POWDER, LYOPHILIZED, FOR SOLUTION INTRAVENOUS EVERY 8 HOURS
Refills: 0 | Status: DISCONTINUED | OUTPATIENT
Start: 2021-07-13 | End: 2021-07-14

## 2021-07-13 RX ORDER — SODIUM CHLORIDE 9 MG/ML
1000 INJECTION, SOLUTION INTRAVENOUS
Refills: 0 | Status: DISCONTINUED | OUTPATIENT
Start: 2021-07-13 | End: 2021-07-14

## 2021-07-13 RX ADMIN — PIPERACILLIN AND TAZOBACTAM 200 GRAM(S): 4; .5 INJECTION, POWDER, LYOPHILIZED, FOR SOLUTION INTRAVENOUS at 06:38

## 2021-07-13 RX ADMIN — FAMOTIDINE 20 MILLIGRAM(S): 10 INJECTION INTRAVENOUS at 04:46

## 2021-07-13 RX ADMIN — PIPERACILLIN AND TAZOBACTAM 25 GRAM(S): 4; .5 INJECTION, POWDER, LYOPHILIZED, FOR SOLUTION INTRAVENOUS at 22:31

## 2021-07-13 RX ADMIN — MORPHINE SULFATE 4 MILLIGRAM(S): 50 CAPSULE, EXTENDED RELEASE ORAL at 04:46

## 2021-07-13 RX ADMIN — Medication 10 MILLIGRAM(S): at 23:12

## 2021-07-13 RX ADMIN — HEPARIN SODIUM 5000 UNIT(S): 5000 INJECTION INTRAVENOUS; SUBCUTANEOUS at 14:56

## 2021-07-13 RX ADMIN — ONDANSETRON 4 MILLIGRAM(S): 8 TABLET, FILM COATED ORAL at 04:46

## 2021-07-13 RX ADMIN — SODIUM CHLORIDE 1000 MILLILITER(S): 9 INJECTION INTRAMUSCULAR; INTRAVENOUS; SUBCUTANEOUS at 06:38

## 2021-07-13 RX ADMIN — PIPERACILLIN AND TAZOBACTAM 25 GRAM(S): 4; .5 INJECTION, POWDER, LYOPHILIZED, FOR SOLUTION INTRAVENOUS at 14:55

## 2021-07-13 RX ADMIN — ONDANSETRON 4 MILLIGRAM(S): 8 TABLET, FILM COATED ORAL at 18:59

## 2021-07-13 RX ADMIN — HYDROMORPHONE HYDROCHLORIDE 1 MILLIGRAM(S): 2 INJECTION INTRAMUSCULAR; INTRAVENOUS; SUBCUTANEOUS at 11:28

## 2021-07-13 RX ADMIN — MORPHINE SULFATE 4 MILLIGRAM(S): 50 CAPSULE, EXTENDED RELEASE ORAL at 05:47

## 2021-07-13 RX ADMIN — HYDROMORPHONE HYDROCHLORIDE 1 MILLIGRAM(S): 2 INJECTION INTRAMUSCULAR; INTRAVENOUS; SUBCUTANEOUS at 15:57

## 2021-07-13 RX ADMIN — SODIUM CHLORIDE 125 MILLILITER(S): 9 INJECTION, SOLUTION INTRAVENOUS at 17:57

## 2021-07-13 NOTE — ED ADULT NURSE REASSESSMENT NOTE - NS ED NURSE REASSESS COMMENT FT1
Break RN- Pt writhing in bed, appears very uncomfortable. Reports excruciating abdominal pain. Medicated per order. Pending US.

## 2021-07-13 NOTE — ED PROVIDER NOTE - OBJECTIVE STATEMENT
31F with PMH biliary colic presents with c/o chest pain and abdominal pain. Pt states that the pain began acutely 3-4 hrs ago. It radiates straight through to his back. He had a similar episode last year and was admitted in January 2021 for biliary colic, dx with acute jeramy. He was supposed to get a cholecystectomy, but the surgery was delayed due to him testing positive for COVID-19. He was treated conservatively with IV abx. He was supposed to f/u to get his surgery, but had hesitation regarding getting anesthesia. He also endorses nausea and cold sweats, but denies emesis, diarrhea, fevers, chills. He smokes weed every day; former smoker from [age] yo, 1 ppd. Fam Hx significant for father passing away from MI at age 56.

## 2021-07-13 NOTE — ED PROVIDER NOTE - FAMILY HISTORY
Father  Still living? Unknown  Family history of myocardial infarction, Age at diagnosis: Age Unknown

## 2021-07-13 NOTE — ED ADULT NURSE NOTE - OBJECTIVE STATEMENT
Received pt to rm 29, A&Ox4, amb. 32y/o male no PMH complaining of Left upper quadrant abdominal pain that radiates to lower back  x 1 day. Pt states its sharp and stabbing in nature. States "this happened last year and they said it was something about my gallbladder." Resps are even and unlabored, spo2 100% on RA. Abdomen is soft, nondistended. Denies palpitations, weakness, dizziness, urinary problems, headache, fevers. 18G IV placed to right AC. MD at bedside for eval.

## 2021-07-13 NOTE — H&P ADULT - NSHPLABSRESULTS_GEN_ALL_CORE
CBC (07-13 @ 05:27)                              14.4                           10.27   )----------------(  220        --    % Neutrophils, --    % Lymphocytes, ANC: --                                  43.8                  BMP (07-13 @ 05:27)             142     |  105     |  18    		Ca++ --      Ca 9.5                ---------------------------------( 114<H>		Mg 2.20               3.7     |  20<L>   |  0.89  			Ph 3.5       LFTs (07-13 @ 05:27)      TPro 6.8 / Alb 4.5 / TBili <0.2 / DBili -- / AST 38 / ALT 27 / AlkPhos 67    Coags (07-13 @ 07:03)  aPTT 28.5 / INR 0.94 / PT 10.7    ABG (07-13 @ 07:03)      /  /  /  /  / %     Lactate:   2.1<H>  ABG (07-13 @ 05:27)      /  /  /  /  / %     Lactate:   3.3<H>    VBG (07-13 @ 07:03)     7.34 / 48 / 65<H> / 24 / 0.1 / 90.9<H>%  VBG (07-13 @ 05:27)     7.43 / 36<L> / 61<H> / 24 / -0.5 / 92.4<H>%    Abdominal U/S IMPRESSION:  Findings suspicious for acute cholecystitis. HIDA scan can be obtained for confirmation.

## 2021-07-13 NOTE — ED PROVIDER NOTE - WR ORDER ID 1
58 year old , obese male, with PMH of ETOH user (last drink 10 days ago as per patient), HTN, HLD, DM-2 (last A1c on 5/29 was 7.0, well managed w/o complications as per patient, on Metformin, endo Maddi Mcarthur), glaucoma, HAYLEE?, GERD, b/l hydrocele, Rt retinal detachment, iron def anemia. FH significant for premature CAD (father and uncle both had MI in their 50's). Patient presented to [facility] ED on 5/28 with complaint of several episodes of lightheadedness and 1 episode of Syncope earlier that day.  He states that he has a history of Heart block and was being considered for PPM. He denies chest pain, palpitations, SOB, weakness, numbness, slurred speech, facial drooping, blurred vision.  Pt also thinks he has sleep apnea as he is very tired constantly and wakes up frequently at night. At [facility] ED w/ SOB and was using BiPAP intermittently. In the ED, pt's EKG on 5/28 showed Mobitz 1, cardiac enzymes normal, CXR read as clear lungs. At [facility] was consulted by both cards and pulmonary (on nightly Bipap and may need home O2 as per their note). 5/31 transferred to [facility] for pacemaker implant as pt is in second degree AVB. EP team aware of pt's arrival. As per Dr. Neal PPM rescheduled for Monday, please make sure pt is NPO after MN on Sunday, DC planning for Tuesday if no complications post procedure. [id number]

## 2021-07-13 NOTE — H&P ADULT - HISTORY OF PRESENT ILLNESS
31F with PMH biliary colic presents with c/o chest pain and abdominal pain. Pt states that the pain began acutely 3-4 hrs ago. It radiates straight through to his back. He denies n/v, CP., fever, but does state he had cold sweats. Advised he took Ibuprofen 600mg which did not relieve the pain. He had a similar episode last year and was admitted in January 2021 for biliary colic, dx with acute jeramy. He was supposed to get a cholecystectomy, but the surgery was delayed due to him testing positive for COVID-19. He was treated conservatively with IV abx. He was supposed to f/u to get his surgery, but had hesitation regarding getting anesthesia.  He smokes weed every day; former smoker, quit 2 years ago. 31F with PMH biliary colic presents with c/o chest pain and abdominal pain. Pt states that the pain began acutely 3-4 hrs ago. It radiates straight through to his back. He denies n/v, CP., fever, but does state he had cold sweats. Advised he took Ibuprofen 600mg which did not relieve the pain. He had a similar episode last year and was admitted in January 2021 for biliary colic, dx with acute jeramy. He was supposed to get a cholecystectomy, but the surgery was delayed due to him testing positive for COVID-19. He was treated conservatively with IV abx. He was supposed to f/u to get his surgery, but had hesitation regarding getting anesthesia.  He smokes weed every day; former smoker, quit 2 years ago. Last meal at 10 pm. COVID negative.

## 2021-07-13 NOTE — ED PROVIDER NOTE - NS ED ROS FT
REVIEW OF SYSTEMS:    CONSTITUTIONAL: No weakness, fevers or chills  EYES/ENT: No visual changes;  No vertigo or throat pain   NECK: No pain or stiffness  RESPIRATORY: No cough, wheezing, hemoptysis; + shortness of breath  CARDIOVASCULAR: + chest pain, no palpitations  GASTROINTESTINAL: + abdominal pain, + epigastric pain. + nausea, no vomiting, or hematemesis; No diarrhea or constipation. No melena or hematochezia.  GENITOURINARY: No dysuria, frequency or hematuria  NEUROLOGICAL: No numbness or weakness  All other review of systems is negative unless indicated above.

## 2021-07-13 NOTE — H&P ADULT - ASSESSMENT
30 y/o male with Acute cholecystitis  1. Admit to Dr Greenberg  2. Patient consented for lap jeramy, possible open, IOC  3. IV fluid hydration   4. IV antibiotics  5. Pain Medicine PRN  6. DVT prophylaxis

## 2021-07-13 NOTE — ED PROVIDER NOTE - PROGRESS NOTE DETAILS
Sign out report received from overnight team, pt w/ cholecystitis on CT, will admit to Dr. Greenberg for surgery.

## 2021-07-13 NOTE — ED PROVIDER NOTE - CLINICAL SUMMARY MEDICAL DECISION MAKING FREE TEXT BOX
31M with PMH biliary colic presents with c/o chest pain and abdominal pain. Obtain ACS r/o with CXR, EKG, trops. Lower on differential given lack of risk factors. Due to hx biliary colic, obtain US abdomen, eval for need for admission and surgical intervention. Morphine for pain control.

## 2021-07-13 NOTE — H&P ADULT - NSHPSOCIALHISTORY_GEN_ALL_CORE
Patient quit smoking 2 years ago. Smoked cigarettes from [age] years old  Admits to smoking marijuana daily  ETOH use occasionally

## 2021-07-13 NOTE — H&P ADULT - ATTENDING COMMENTS
The History was reviewed and the patient was examined.  Work-up is consistent with acute cholecystitis and cholelithiasis.  Plan: Laparoscopic, possible open cholecystectomy with IOC, possible ERCP discussed in detail with the patient including risks: hemorrhage, infection, bile duct/intestinal injuries, pancreatitis.  The patient understands the planned treatment and agrees to proceed with it.

## 2021-07-13 NOTE — ED PROVIDER NOTE - ATTENDING CONTRIBUTION TO CARE
Afebrile. Awake and Alert. Lungs CTA. Heart RRR. Abdomen soft, +TTP RUQ, ND. CN II-XII grossly intact. Moves all extremities without lateralization.    r/o gallbladder disease  r/o pancreatitis  r/o gastritits

## 2021-07-13 NOTE — H&P ADULT - NSICDXFAMILYHX_GEN_ALL_CORE_FT
FAMILY HISTORY:  Father  Still living? Unknown  Family history of myocardial infarction, Age at diagnosis: Age Unknown

## 2021-07-14 ENCOUNTER — RESULT REVIEW (OUTPATIENT)
Age: 31
End: 2021-07-14

## 2021-07-14 ENCOUNTER — TRANSCRIPTION ENCOUNTER (OUTPATIENT)
Age: 31
End: 2021-07-14

## 2021-07-14 LAB
ALBUMIN SERPL ELPH-MCNC: 4.5 G/DL — SIGNIFICANT CHANGE UP (ref 3.3–5)
ALP SERPL-CCNC: 67 U/L — SIGNIFICANT CHANGE UP (ref 40–120)
ALT FLD-CCNC: 79 U/L — HIGH (ref 4–41)
ANION GAP SERPL CALC-SCNC: 11 MMOL/L — SIGNIFICANT CHANGE UP (ref 7–14)
APTT BLD: 28.5 SEC — SIGNIFICANT CHANGE UP (ref 27–36.3)
AST SERPL-CCNC: 38 U/L — SIGNIFICANT CHANGE UP (ref 4–40)
BILIRUB SERPL-MCNC: 0.3 MG/DL — SIGNIFICANT CHANGE UP (ref 0.2–1.2)
BLD GP AB SCN SERPL QL: NEGATIVE — SIGNIFICANT CHANGE UP
BUN SERPL-MCNC: 10 MG/DL — SIGNIFICANT CHANGE UP (ref 7–23)
CALCIUM SERPL-MCNC: 9.6 MG/DL — SIGNIFICANT CHANGE UP (ref 8.4–10.5)
CHLORIDE SERPL-SCNC: 104 MMOL/L — SIGNIFICANT CHANGE UP (ref 98–107)
CO2 SERPL-SCNC: 25 MMOL/L — SIGNIFICANT CHANGE UP (ref 22–31)
CREAT SERPL-MCNC: 1.01 MG/DL — SIGNIFICANT CHANGE UP (ref 0.5–1.3)
GLUCOSE SERPL-MCNC: 101 MG/DL — HIGH (ref 70–99)
HCT VFR BLD CALC: 47.3 % — SIGNIFICANT CHANGE UP (ref 39–50)
HGB BLD-MCNC: 15.6 G/DL — SIGNIFICANT CHANGE UP (ref 13–17)
INR BLD: 1.09 RATIO — SIGNIFICANT CHANGE UP (ref 0.88–1.16)
MAGNESIUM SERPL-MCNC: 2.1 MG/DL — SIGNIFICANT CHANGE UP (ref 1.6–2.6)
MCHC RBC-ENTMCNC: 29.2 PG — SIGNIFICANT CHANGE UP (ref 27–34)
MCHC RBC-ENTMCNC: 33 GM/DL — SIGNIFICANT CHANGE UP (ref 32–36)
MCV RBC AUTO: 88.6 FL — SIGNIFICANT CHANGE UP (ref 80–100)
NRBC # BLD: 0 /100 WBCS — SIGNIFICANT CHANGE UP
NRBC # FLD: 0 K/UL — SIGNIFICANT CHANGE UP
PHOSPHATE SERPL-MCNC: 3.8 MG/DL — SIGNIFICANT CHANGE UP (ref 2.5–4.5)
PLATELET # BLD AUTO: 212 K/UL — SIGNIFICANT CHANGE UP (ref 150–400)
POTASSIUM SERPL-MCNC: 3.6 MMOL/L — SIGNIFICANT CHANGE UP (ref 3.5–5.3)
POTASSIUM SERPL-SCNC: 3.6 MMOL/L — SIGNIFICANT CHANGE UP (ref 3.5–5.3)
PROT SERPL-MCNC: 6.9 G/DL — SIGNIFICANT CHANGE UP (ref 6–8.3)
PROTHROM AB SERPL-ACNC: 12.4 SEC — SIGNIFICANT CHANGE UP (ref 10.6–13.6)
RBC # BLD: 5.34 M/UL — SIGNIFICANT CHANGE UP (ref 4.2–5.8)
RBC # FLD: 12.5 % — SIGNIFICANT CHANGE UP (ref 10.3–14.5)
RH IG SCN BLD-IMP: POSITIVE — SIGNIFICANT CHANGE UP
SODIUM SERPL-SCNC: 140 MMOL/L — SIGNIFICANT CHANGE UP (ref 135–145)
WBC # BLD: 9.02 K/UL — SIGNIFICANT CHANGE UP (ref 3.8–10.5)
WBC # FLD AUTO: 9.02 K/UL — SIGNIFICANT CHANGE UP (ref 3.8–10.5)

## 2021-07-14 PROCEDURE — 88304 TISSUE EXAM BY PATHOLOGIST: CPT | Mod: 26

## 2021-07-14 PROCEDURE — 74018 RADEX ABDOMEN 1 VIEW: CPT | Mod: 26

## 2021-07-14 RX ORDER — POTASSIUM CHLORIDE 20 MEQ
10 PACKET (EA) ORAL
Refills: 0 | Status: COMPLETED | OUTPATIENT
Start: 2021-07-14 | End: 2021-07-14

## 2021-07-14 RX ORDER — HYDROMORPHONE HYDROCHLORIDE 2 MG/ML
1 INJECTION INTRAMUSCULAR; INTRAVENOUS; SUBCUTANEOUS
Refills: 0 | Status: DISCONTINUED | OUTPATIENT
Start: 2021-07-14 | End: 2021-07-14

## 2021-07-14 RX ORDER — ONDANSETRON 8 MG/1
4 TABLET, FILM COATED ORAL ONCE
Refills: 0 | Status: DISCONTINUED | OUTPATIENT
Start: 2021-07-14 | End: 2021-07-14

## 2021-07-14 RX ORDER — SODIUM CHLORIDE 9 MG/ML
1000 INJECTION, SOLUTION INTRAVENOUS
Refills: 0 | Status: DISCONTINUED | OUTPATIENT
Start: 2021-07-14 | End: 2021-07-14

## 2021-07-14 RX ORDER — HYDROMORPHONE HYDROCHLORIDE 2 MG/ML
0.5 INJECTION INTRAMUSCULAR; INTRAVENOUS; SUBCUTANEOUS
Refills: 0 | Status: DISCONTINUED | OUTPATIENT
Start: 2021-07-14 | End: 2021-07-14

## 2021-07-14 RX ORDER — OXYCODONE HYDROCHLORIDE 5 MG/1
10 TABLET ORAL EVERY 4 HOURS
Refills: 0 | Status: DISCONTINUED | OUTPATIENT
Start: 2021-07-14 | End: 2021-07-14

## 2021-07-14 RX ORDER — ACETAMINOPHEN 500 MG
1000 TABLET ORAL ONCE
Refills: 0 | Status: COMPLETED | OUTPATIENT
Start: 2021-07-14 | End: 2021-07-14

## 2021-07-14 RX ORDER — METOCLOPRAMIDE HCL 10 MG
10 TABLET ORAL ONCE
Refills: 0 | Status: COMPLETED | OUTPATIENT
Start: 2021-07-14 | End: 2021-07-14

## 2021-07-14 RX ORDER — ACETAMINOPHEN 500 MG
650 TABLET ORAL EVERY 6 HOURS
Refills: 0 | Status: DISCONTINUED | OUTPATIENT
Start: 2021-07-14 | End: 2021-07-15

## 2021-07-14 RX ORDER — OXYCODONE HYDROCHLORIDE 5 MG/1
5 TABLET ORAL EVERY 4 HOURS
Refills: 0 | Status: DISCONTINUED | OUTPATIENT
Start: 2021-07-14 | End: 2021-07-14

## 2021-07-14 RX ADMIN — Medication 400 MILLIGRAM(S): at 20:37

## 2021-07-14 RX ADMIN — SODIUM CHLORIDE 125 MILLILITER(S): 9 INJECTION, SOLUTION INTRAVENOUS at 05:48

## 2021-07-14 RX ADMIN — Medication 10 MILLIGRAM(S): at 21:24

## 2021-07-14 RX ADMIN — PIPERACILLIN AND TAZOBACTAM 25 GRAM(S): 4; .5 INJECTION, POWDER, LYOPHILIZED, FOR SOLUTION INTRAVENOUS at 05:49

## 2021-07-14 RX ADMIN — HYDROMORPHONE HYDROCHLORIDE 1 MILLIGRAM(S): 2 INJECTION INTRAMUSCULAR; INTRAVENOUS; SUBCUTANEOUS at 22:48

## 2021-07-14 RX ADMIN — HYDROMORPHONE HYDROCHLORIDE 1 MILLIGRAM(S): 2 INJECTION INTRAMUSCULAR; INTRAVENOUS; SUBCUTANEOUS at 18:35

## 2021-07-14 RX ADMIN — HEPARIN SODIUM 5000 UNIT(S): 5000 INJECTION INTRAVENOUS; SUBCUTANEOUS at 05:49

## 2021-07-14 RX ADMIN — Medication 1000 MILLIGRAM(S): at 21:33

## 2021-07-14 RX ADMIN — HEPARIN SODIUM 5000 UNIT(S): 5000 INJECTION INTRAVENOUS; SUBCUTANEOUS at 21:24

## 2021-07-14 RX ADMIN — HYDROMORPHONE HYDROCHLORIDE 1 MILLIGRAM(S): 2 INJECTION INTRAMUSCULAR; INTRAVENOUS; SUBCUTANEOUS at 23:20

## 2021-07-14 RX ADMIN — SODIUM CHLORIDE 75 MILLILITER(S): 9 INJECTION, SOLUTION INTRAVENOUS at 20:37

## 2021-07-14 NOTE — BRIEF OPERATIVE NOTE - OPERATION/FINDINGS
Laparoscopic cholecystectomy with intraoperative cholangiogram - No filling defects notes Laparoscopic cholecystectomy with intraoperative cholangiogram - No filling defects noted

## 2021-07-14 NOTE — DISCHARGE NOTE PROVIDER - NSDCCPTREATMENT_GEN_ALL_CORE_FT
PRINCIPAL PROCEDURE  Procedure: Laparoscopic cholecystectomy using multiple ports  Findings and Treatment:

## 2021-07-14 NOTE — DISCHARGE NOTE PROVIDER - NSDCFUADDINST_GEN_ALL_CORE_FT
WOUND CARE: Staples to be removed in office. Dressing can be removed on 7/15/2021  BATHING: Please do not submerge wound underwater. You may shower and/or sponge bathe.  ACTIVITY: No heavy lifting or straining. Otherwise, you may return to your usual level of physical activity. If you are taking narcotic pain medication (such as Percocet), do NOT drive a car, operate machinery or make important decisions.  DIET: Return to your usual diet.  NOTIFY YOUR SURGEON IF: You have any bleeding that does not stop, any pus draining from your wound, any fever (over 100.4 F) or chills, persistent nausea/vomiting, persistent diarrhea, or if your pain is not controlled on your discharge pain medications.  FOLLOW-UP:  1. Follow-up with Dr. Greenberg within 1-2 weeks of discharge.  Please call office for appointment  2. Please follow up with your primary care physician in one week regarding your hospitalization.

## 2021-07-14 NOTE — DISCHARGE NOTE PROVIDER - NSDCMRMEDTOKEN_GEN_ALL_CORE_FT
acetaminophen 325 mg oral tablet: 3 tab(s) orally every 6 hours, As needed, Mild Pain (1 - 3)   acetaminophen 325 mg oral tablet: 3 tab(s) orally every 6 hours, As needed, Mild Pain (1 - 3)  oxyCODONE 5 mg oral tablet: 1 tab(s) orally every 6 hours, As Needed -for severe pain - for moderate pain MDD:4 tabs daily    acetaminophen 325 mg oral tablet: 2 tab(s) orally every 6 hours, As Needed - 3)  oxyCODONE 5 mg oral tablet: 1 tab(s) orally every 6 hours, As Needed -for severe pain - for moderate pain MDD:4 tabs daily    acetaminophen 325 mg oral tablet: 2 tab(s) orally every 6 hours, As Needed - 3)  oxyCODONE 5 mg oral tablet: 1 tab(s) orally every 6 hours, As Needed -for severe pain - for moderate pain MDD:4 tabs daily   Zofran 4 mg oral tablet: 1 tab(s) orally once a day  as needed for nausea

## 2021-07-14 NOTE — DISCHARGE NOTE PROVIDER - HOSPITAL COURSE
31F with PMH biliary colic presents with c/o chest pain and abdominal pain. Pt states that the pain began acutely 3-4 hrs ago. It radiates straight through to his back. He denies n/v, CP., fever, but does state he had cold sweats. Advised he took Ibuprofen 600mg which did not relieve the pain. He had a similar episode last year and was admitted in January 2021 for biliary colic, dx with acute jeramy. He was supposed to get a cholecystectomy, but the surgery was delayed due to him testing positive for COVID-19. He was treated conservatively with IV abx. He was supposed to f/u to get his surgery, but had hesitation regarding getting anesthesia.  He smokes weed every day; former smoker, quit 2 years ago. Last meal at 10 pm. COVID negative.    US findings consistent with acute cholecystitis. Underwent laparoscopic cholecystectomy with intraoperative cholangiogram - No filling defects noted.      Deemed stable for discharge after demonstrating adequate pain control, ambulation and diet tolerance.

## 2021-07-14 NOTE — PATIENT PROFILE ADULT - NSPROPTRIGHTSUPPORTPERSON_GEN_A_NUR
Quality 110: Preventive Care And Screening: Influenza Immunization: Influenza Immunization Administered during Influenza season Detail Level: Detailed yes

## 2021-07-14 NOTE — BRIEF OPERATIVE NOTE - NSICDXBRIEFPROCEDURE_GEN_ALL_CORE_FT
PROCEDURES:  Laparoscopic cholecystectomy using multiple ports 14-Jul-2021 17:17:55  Pradeep Hernandez

## 2021-07-14 NOTE — PRE-OP CHECKLIST - PATIENT'S PERSONAL PROPERTY REMOVED
Patient returning call to clinic. She was advised of results and recommendations for Entyvio drug level. She was advised that authorization is under review for medical necessity.     Patient reports she is doing well as far as her GI and CD sxs.  \" I do have bloating all the time in my abdomen. I look like I am pregnant. I have tried different foods and probiotics. Nothing really helps. I felt the best a few moths ago when I had diarrhea for awhile. \"    Patient advised that fecal calprotectin requested last month is still recommended. This may give more information about her bloating.    none

## 2021-07-15 LAB
ALBUMIN SERPL ELPH-MCNC: 4.4 G/DL — SIGNIFICANT CHANGE UP (ref 3.3–5)
ALP SERPL-CCNC: 59 U/L — SIGNIFICANT CHANGE UP (ref 40–120)
ALT FLD-CCNC: 74 U/L — HIGH (ref 4–41)
ANION GAP SERPL CALC-SCNC: 17 MMOL/L — HIGH (ref 7–14)
AST SERPL-CCNC: 41 U/L — HIGH (ref 4–40)
BILIRUB SERPL-MCNC: 0.3 MG/DL — SIGNIFICANT CHANGE UP (ref 0.2–1.2)
BUN SERPL-MCNC: 8 MG/DL — SIGNIFICANT CHANGE UP (ref 7–23)
CALCIUM SERPL-MCNC: 9.3 MG/DL — SIGNIFICANT CHANGE UP (ref 8.4–10.5)
CHLORIDE SERPL-SCNC: 103 MMOL/L — SIGNIFICANT CHANGE UP (ref 98–107)
CO2 SERPL-SCNC: 23 MMOL/L — SIGNIFICANT CHANGE UP (ref 22–31)
CREAT SERPL-MCNC: 0.88 MG/DL — SIGNIFICANT CHANGE UP (ref 0.5–1.3)
GLUCOSE SERPL-MCNC: 98 MG/DL — SIGNIFICANT CHANGE UP (ref 70–99)
HCT VFR BLD CALC: 44.1 % — SIGNIFICANT CHANGE UP (ref 39–50)
HGB BLD-MCNC: 14.2 G/DL — SIGNIFICANT CHANGE UP (ref 13–17)
MAGNESIUM SERPL-MCNC: 2.1 MG/DL — SIGNIFICANT CHANGE UP (ref 1.6–2.6)
MCHC RBC-ENTMCNC: 29.2 PG — SIGNIFICANT CHANGE UP (ref 27–34)
MCHC RBC-ENTMCNC: 32.2 GM/DL — SIGNIFICANT CHANGE UP (ref 32–36)
MCV RBC AUTO: 90.7 FL — SIGNIFICANT CHANGE UP (ref 80–100)
NRBC # BLD: 0 /100 WBCS — SIGNIFICANT CHANGE UP
NRBC # FLD: 0 K/UL — SIGNIFICANT CHANGE UP
PHOSPHATE SERPL-MCNC: 3.7 MG/DL — SIGNIFICANT CHANGE UP (ref 2.5–4.5)
PLATELET # BLD AUTO: 206 K/UL — SIGNIFICANT CHANGE UP (ref 150–400)
POTASSIUM SERPL-MCNC: 3.4 MMOL/L — LOW (ref 3.5–5.3)
POTASSIUM SERPL-SCNC: 3.4 MMOL/L — LOW (ref 3.5–5.3)
PROT SERPL-MCNC: 6.7 G/DL — SIGNIFICANT CHANGE UP (ref 6–8.3)
RBC # BLD: 4.86 M/UL — SIGNIFICANT CHANGE UP (ref 4.2–5.8)
RBC # FLD: 12.5 % — SIGNIFICANT CHANGE UP (ref 10.3–14.5)
SODIUM SERPL-SCNC: 143 MMOL/L — SIGNIFICANT CHANGE UP (ref 135–145)
WBC # BLD: 13.49 K/UL — HIGH (ref 3.8–10.5)
WBC # FLD AUTO: 13.49 K/UL — HIGH (ref 3.8–10.5)

## 2021-07-15 RX ORDER — OXYCODONE HYDROCHLORIDE 5 MG/1
1 TABLET ORAL
Qty: 5 | Refills: 0
Start: 2021-07-15

## 2021-07-15 RX ORDER — HYDROMORPHONE HYDROCHLORIDE 2 MG/ML
0.5 INJECTION INTRAMUSCULAR; INTRAVENOUS; SUBCUTANEOUS ONCE
Refills: 0 | Status: DISCONTINUED | OUTPATIENT
Start: 2021-07-15 | End: 2021-07-15

## 2021-07-15 RX ORDER — ONDANSETRON 8 MG/1
4 TABLET, FILM COATED ORAL ONCE
Refills: 0 | Status: COMPLETED | OUTPATIENT
Start: 2021-07-15 | End: 2021-07-15

## 2021-07-15 RX ORDER — OXYCODONE HYDROCHLORIDE 5 MG/1
5 TABLET ORAL EVERY 4 HOURS
Refills: 0 | Status: DISCONTINUED | OUTPATIENT
Start: 2021-07-15 | End: 2021-07-16

## 2021-07-15 RX ORDER — OXYCODONE HYDROCHLORIDE 5 MG/1
10 TABLET ORAL EVERY 4 HOURS
Refills: 0 | Status: DISCONTINUED | OUTPATIENT
Start: 2021-07-15 | End: 2021-07-16

## 2021-07-15 RX ORDER — POTASSIUM CHLORIDE 20 MEQ
40 PACKET (EA) ORAL ONCE
Refills: 0 | Status: COMPLETED | OUTPATIENT
Start: 2021-07-15 | End: 2021-07-15

## 2021-07-15 RX ORDER — ACETAMINOPHEN 500 MG
1000 TABLET ORAL ONCE
Refills: 0 | Status: COMPLETED | OUTPATIENT
Start: 2021-07-15 | End: 2021-07-15

## 2021-07-15 RX ORDER — ACETAMINOPHEN 500 MG
650 TABLET ORAL EVERY 6 HOURS
Refills: 0 | Status: DISCONTINUED | OUTPATIENT
Start: 2021-07-15 | End: 2021-07-16

## 2021-07-15 RX ADMIN — OXYCODONE HYDROCHLORIDE 10 MILLIGRAM(S): 5 TABLET ORAL at 22:01

## 2021-07-15 RX ADMIN — HYDROMORPHONE HYDROCHLORIDE 1 MILLIGRAM(S): 2 INJECTION INTRAMUSCULAR; INTRAVENOUS; SUBCUTANEOUS at 10:15

## 2021-07-15 RX ADMIN — HEPARIN SODIUM 5000 UNIT(S): 5000 INJECTION INTRAVENOUS; SUBCUTANEOUS at 15:51

## 2021-07-15 RX ADMIN — HYDROMORPHONE HYDROCHLORIDE 0.5 MILLIGRAM(S): 2 INJECTION INTRAMUSCULAR; INTRAVENOUS; SUBCUTANEOUS at 02:13

## 2021-07-15 RX ADMIN — HEPARIN SODIUM 5000 UNIT(S): 5000 INJECTION INTRAVENOUS; SUBCUTANEOUS at 06:13

## 2021-07-15 RX ADMIN — Medication 40 MILLIEQUIVALENT(S): at 10:39

## 2021-07-15 RX ADMIN — ONDANSETRON 4 MILLIGRAM(S): 8 TABLET, FILM COATED ORAL at 10:40

## 2021-07-15 RX ADMIN — OXYCODONE HYDROCHLORIDE 10 MILLIGRAM(S): 5 TABLET ORAL at 22:31

## 2021-07-15 RX ADMIN — OXYCODONE HYDROCHLORIDE 10 MILLIGRAM(S): 5 TABLET ORAL at 13:59

## 2021-07-15 RX ADMIN — HYDROMORPHONE HYDROCHLORIDE 1 MILLIGRAM(S): 2 INJECTION INTRAMUSCULAR; INTRAVENOUS; SUBCUTANEOUS at 09:26

## 2021-07-15 RX ADMIN — Medication 1000 MILLIGRAM(S): at 16:11

## 2021-07-15 RX ADMIN — OXYCODONE HYDROCHLORIDE 10 MILLIGRAM(S): 5 TABLET ORAL at 13:21

## 2021-07-15 RX ADMIN — Medication 400 MILLIGRAM(S): at 15:50

## 2021-07-15 RX ADMIN — Medication 650 MILLIGRAM(S): at 04:17

## 2021-07-15 NOTE — PROGRESS NOTE ADULT - ASSESSMENT
Assessment:   32 y/o male with Acute cholecystitis s/p lap jeramy      PLAN (to be discussed with attending in AM):  -  -  -  -  - Assessment:   32 y/o male with Acute cholecystitis s/p lap jeramy      PLAN (to be discussed with attending in AM):  - Pain control   - Reg diet  - daily labs  - DVT ppx  - Discharge home tomorrow

## 2021-07-15 NOTE — PROGRESS NOTE ADULT - SUBJECTIVE AND OBJECTIVE BOX
Overnight: No acute events.    Subjective:     Objective:  Vital Signs (24 Hrs):  T(C): 36.9 (07-15-21 @ 04:21), Max: 37 (07-14-21 @ 18:00)  HR: 80 (07-15-21 @ 04:21) (58 - 80)  BP: 123/53 (07-15-21 @ 04:21) (114/76 - 154/96)  RR: 18 (07-15-21 @ 04:21) (1 - 19)  SpO2: 98% (07-15-21 @ 04:21) (93% - 100%)  Wt(kg): --  Daily Height in cm: 182.9 (14 Jul 2021 12:44)    Daily     I&O's Summary    13 Jul 2021 07:01  -  14 Jul 2021 07:00  --------------------------------------------------------  IN: 500 mL / OUT: 500 mL / NET: 0 mL    14 Jul 2021 07:01  -  15 Jul 2021 04:27  --------------------------------------------------------  IN: 425 mL / OUT: 1350 mL / NET: -925 mL        MEDICATIONS  (STANDING):  heparin   Injectable 5000 Unit(s) SubCutaneous every 8 hours    MEDICATIONS  (PRN):  acetaminophen   Tablet .. 650 milliGRAM(s) Oral every 6 hours PRN Mild Pain (1 - 3)  HYDROmorphone  Injectable 0.5 milliGRAM(s) IV Push every 4 hours PRN Moderate Pain (4 - 6)  HYDROmorphone  Injectable 1 milliGRAM(s) IV Push every 4 hours PRN Severe Pain (7 - 10)      PHYSICAL EXAM (to be evaluated on morning rounds):  GENERAL: NAD, lying in bed comfortably  CHEST: nonlabored, no increased WOB  ABDOMEN: Soft, Nontender, Nondistended. Incision sites clean, dry with no erythema or induration.  EXTREMITIES: Well perfused. No clubbing, cyanosis, or edema  NERVOUS SYSTEM:  Alert & Oriented X3    LABS:                         15.6   9.02  )-----------( 212      ( 14 Jul 2021 12:39 )             47.3     07-14    140  |  104  |  10  ----------------------------<  101<H>  3.6   |  25  |  1.01    Ca    9.6      14 Jul 2021 12:39  Phos  3.8     07-14  Mg     2.10     07-14    TPro  6.9  /  Alb  4.5  /  TBili  0.3  /  DBili  x   /  AST  38  /  ALT  79<H>  /  AlkPhos  67  07-14    PT/INR - ( 14 Jul 2021 12:39 )   PT: 12.4 sec;   INR: 1.09 ratio         PTT - ( 14 Jul 2021 12:39 )  PTT:28.5 sec          RADIOLOGY, EKG & ADDITIONAL TESTS: Reviewed.    Overnight: No acute events.    Subjective: Mild nausea, otherwise no complaints.     Objective:  Vital Signs (24 Hrs):  T(C): 36.9 (07-15-21 @ 04:21), Max: 37 (07-14-21 @ 18:00)  HR: 80 (07-15-21 @ 04:21) (58 - 80)  BP: 123/53 (07-15-21 @ 04:21) (114/76 - 154/96)  RR: 18 (07-15-21 @ 04:21) (1 - 19)  SpO2: 98% (07-15-21 @ 04:21) (93% - 100%)  Wt(kg): --  Daily Height in cm: 182.9 (14 Jul 2021 12:44)    Daily     I&O's Summary    13 Jul 2021 07:01  -  14 Jul 2021 07:00  --------------------------------------------------------  IN: 500 mL / OUT: 500 mL / NET: 0 mL    14 Jul 2021 07:01  -  15 Jul 2021 04:27  --------------------------------------------------------  IN: 425 mL / OUT: 1350 mL / NET: -925 mL        MEDICATIONS  (STANDING):  heparin   Injectable 5000 Unit(s) SubCutaneous every 8 hours    MEDICATIONS  (PRN):  acetaminophen   Tablet .. 650 milliGRAM(s) Oral every 6 hours PRN Mild Pain (1 - 3)  HYDROmorphone  Injectable 0.5 milliGRAM(s) IV Push every 4 hours PRN Moderate Pain (4 - 6)  HYDROmorphone  Injectable 1 milliGRAM(s) IV Push every 4 hours PRN Severe Pain (7 - 10)      PHYSICAL EXAM (to be evaluated on morning rounds):  GENERAL: NAD, lying in bed comfortably  CHEST: nonlabored, no increased WOB  ABDOMEN: Soft, Nontender, Nondistended. Incision sites clean, dry with no erythema or induration.  EXTREMITIES: Well perfused. No clubbing, cyanosis, or edema  NERVOUS SYSTEM:  Alert & Oriented X3    LABS:                         15.6   9.02  )-----------( 212      ( 14 Jul 2021 12:39 )             47.3     07-14    140  |  104  |  10  ----------------------------<  101<H>  3.6   |  25  |  1.01    Ca    9.6      14 Jul 2021 12:39  Phos  3.8     07-14  Mg     2.10     07-14    TPro  6.9  /  Alb  4.5  /  TBili  0.3  /  DBili  x   /  AST  38  /  ALT  79<H>  /  AlkPhos  67  07-14    PT/INR - ( 14 Jul 2021 12:39 )   PT: 12.4 sec;   INR: 1.09 ratio         PTT - ( 14 Jul 2021 12:39 )  PTT:28.5 sec          RADIOLOGY, EKG & ADDITIONAL TESTS: Reviewed.

## 2021-07-15 NOTE — CHART NOTE - NSCHARTNOTEFT_GEN_A_CORE
POST-OPERATIVE NOTE @ 9:00 pm 7/15/21    Subjective:  Patient is s/p Laparoscopic cholecystectomy. Recovering appropriately.     Vital Signs Last 24 Hrs  T(C): 36.9 (15 Jul 2021 04:21), Max: 37 (14 Jul 2021 18:00)  T(F): 98.4 (15 Jul 2021 04:21), Max: 98.6 (14 Jul 2021 18:00)  HR: 80 (15 Jul 2021 04:21) (58 - 80)  BP: 123/53 (15 Jul 2021 04:21) (114/76 - 154/96)  BP(mean): 79 (14 Jul 2021 19:45) (69 - 85)  RR: 18 (15 Jul 2021 04:21) (1 - 19)  SpO2: 98% (15 Jul 2021 04:21) (93% - 100%)  I&O's Detail    13 Jul 2021 07:01  -  14 Jul 2021 07:00  --------------------------------------------------------  IN:    IV PiggyBack: 250 mL    Lactated Ringers: 250 mL  Total IN: 500 mL    OUT:    Voided (mL): 500 mL  Total OUT: 500 mL    Total NET: 0 mL      14 Jul 2021 07:01  -  15 Jul 2021 04:24  --------------------------------------------------------  IN:    Lactated Ringers: 125 mL    Lactated Ringers: 300 mL  Total IN: 425 mL    OUT:    Voided (mL): 1350 mL  Total OUT: 1350 mL    Total NET: -925 mL        heparin   Injectable 5000    PAST MEDICAL & SURGICAL HISTORY:  No pertinent past medical history    History of surgery of head        Physical Exam:   GEN: resting in bed comfortably in NAD  RESP: no increased WOB  ABD: soft, non-distended, appropriately tender around incisions without rebound tenderness or guarding, dressings c/d/i  EXTR: warm, well-perfused without gross deformities; spontaneous movement in b/l U/L extrem  NEURO: awake, alert     LABS:                        15.6   9.02  )-----------( 212      ( 14 Jul 2021 12:39 )             47.3     07-14    140  |  104  |  10  ----------------------------<  101<H>  3.6   |  25  |  1.01    Ca    9.6      14 Jul 2021 12:39  Phos  3.8     07-14  Mg     2.10     07-14    TPro  6.9  /  Alb  4.5  /  TBili  0.3  /  DBili  x   /  AST  38  /  ALT  79<H>  /  AlkPhos  67  07-14    PT/INR - ( 14 Jul 2021 12:39 )   PT: 12.4 sec;   INR: 1.09 ratio         PTT - ( 14 Jul 2021 12:39 )  PTT:28.5 sec  CAPILLARY BLOOD GLUCOSE          Radiology and Additional Studies:    Assessment:  The patient is a 31y Male who is now several hours post-op from a laparoscopic cholecystectomy.    Plan:  - Pain control as needed  - DVT ppx  - OOB and ambulating as tolerated  - F/u AM labs

## 2021-07-16 ENCOUNTER — TRANSCRIPTION ENCOUNTER (OUTPATIENT)
Age: 31
End: 2021-07-16

## 2021-07-16 VITALS
HEART RATE: 108 BPM | OXYGEN SATURATION: 97 % | DIASTOLIC BLOOD PRESSURE: 84 MMHG | SYSTOLIC BLOOD PRESSURE: 145 MMHG | TEMPERATURE: 98 F | RESPIRATION RATE: 18 BRPM

## 2021-07-16 LAB
ANION GAP SERPL CALC-SCNC: 16 MMOL/L — HIGH (ref 7–14)
BUN SERPL-MCNC: 9 MG/DL — SIGNIFICANT CHANGE UP (ref 7–23)
CALCIUM SERPL-MCNC: 9.4 MG/DL — SIGNIFICANT CHANGE UP (ref 8.4–10.5)
CHLORIDE SERPL-SCNC: 103 MMOL/L — SIGNIFICANT CHANGE UP (ref 98–107)
CO2 SERPL-SCNC: 21 MMOL/L — LOW (ref 22–31)
CREAT SERPL-MCNC: 0.88 MG/DL — SIGNIFICANT CHANGE UP (ref 0.5–1.3)
GLUCOSE SERPL-MCNC: 98 MG/DL — SIGNIFICANT CHANGE UP (ref 70–99)
HCT VFR BLD CALC: 46.1 % — SIGNIFICANT CHANGE UP (ref 39–50)
HGB BLD-MCNC: 15.2 G/DL — SIGNIFICANT CHANGE UP (ref 13–17)
MAGNESIUM SERPL-MCNC: 2.2 MG/DL — SIGNIFICANT CHANGE UP (ref 1.6–2.6)
MCHC RBC-ENTMCNC: 29.7 PG — SIGNIFICANT CHANGE UP (ref 27–34)
MCHC RBC-ENTMCNC: 33 GM/DL — SIGNIFICANT CHANGE UP (ref 32–36)
MCV RBC AUTO: 90.2 FL — SIGNIFICANT CHANGE UP (ref 80–100)
NRBC # BLD: 0 /100 WBCS — SIGNIFICANT CHANGE UP
NRBC # FLD: 0 K/UL — SIGNIFICANT CHANGE UP
PHOSPHATE SERPL-MCNC: 3.2 MG/DL — SIGNIFICANT CHANGE UP (ref 2.5–4.5)
PLATELET # BLD AUTO: 215 K/UL — SIGNIFICANT CHANGE UP (ref 150–400)
POTASSIUM SERPL-MCNC: 4 MMOL/L — SIGNIFICANT CHANGE UP (ref 3.5–5.3)
POTASSIUM SERPL-SCNC: 4 MMOL/L — SIGNIFICANT CHANGE UP (ref 3.5–5.3)
RBC # BLD: 5.11 M/UL — SIGNIFICANT CHANGE UP (ref 4.2–5.8)
RBC # FLD: 12.5 % — SIGNIFICANT CHANGE UP (ref 10.3–14.5)
SODIUM SERPL-SCNC: 140 MMOL/L — SIGNIFICANT CHANGE UP (ref 135–145)
WBC # BLD: 9.95 K/UL — SIGNIFICANT CHANGE UP (ref 3.8–10.5)
WBC # FLD AUTO: 9.95 K/UL — SIGNIFICANT CHANGE UP (ref 3.8–10.5)

## 2021-07-16 RX ORDER — ONDANSETRON 8 MG/1
1 TABLET, FILM COATED ORAL
Qty: 10 | Refills: 0
Start: 2021-07-16

## 2021-07-16 RX ORDER — ONDANSETRON 8 MG/1
4 TABLET, FILM COATED ORAL ONCE
Refills: 0 | Status: COMPLETED | OUTPATIENT
Start: 2021-07-16 | End: 2021-07-16

## 2021-07-16 RX ADMIN — Medication 650 MILLIGRAM(S): at 06:21

## 2021-07-16 RX ADMIN — ONDANSETRON 4 MILLIGRAM(S): 8 TABLET, FILM COATED ORAL at 12:58

## 2021-07-16 RX ADMIN — Medication 650 MILLIGRAM(S): at 05:51

## 2021-07-16 RX ADMIN — Medication 650 MILLIGRAM(S): at 12:59

## 2021-07-16 NOTE — PROGRESS NOTE ADULT - ASSESSMENT
Assessment:   30 y/o male with Acute cholecystitis s/p lap jeramy      PLAN (to be discussed with attending in AM):  -  -  -  -  - Assessment:   30 y/o male with Acute cholecystitis s/p lap jeramy      PLAN:  - Pain control   - Reg diet  - daily labs  - DVT ppx  - Discharge home today

## 2021-07-16 NOTE — DISCHARGE NOTE NURSING/CASE MANAGEMENT/SOCIAL WORK - PATIENT PORTAL LINK FT
You can access the FollowMyHealth Patient Portal offered by [facility] by registering at the following website: [email or web address]. By joining [facility]’s FollowMyHealth portal, you will also be able to view your health information using other applications (apps) compatible with our system.

## 2021-07-16 NOTE — DISCHARGE NOTE NURSING/CASE MANAGEMENT/SOCIAL WORK - NSDCPNINST_GEN_ALL_CORE
Shower daily, wash incisions with mild soap and water, pat dry. No lotion , creams, or powder on incisions. Check incisions daily for signs of infection, redness, swelling drainage or temp greater than 101.0, CALL MD. No heavy lifting greater than 10-15 lbs. No driving if taking narcotics or until cleared by MD. Keep all follow up appointments and take all medications as prescribed.

## 2021-07-16 NOTE — PROGRESS NOTE ADULT - SUBJECTIVE AND OBJECTIVE BOX
Overnight: No acute events.    Subjective:     Objective:  Vital Signs (24 Hrs):  T(C): 36.8 (07-15-21 @ 23:59), Max: 37.4 (07-15-21 @ 21:23)  HR: 57 (07-15-21 @ 23:59) (57 - 71)  BP: 148/76 (07-15-21 @ 23:59) (136/77 - 161/70)  RR: 17 (07-15-21 @ 23:59) (17 - 18)  SpO2: 99% (07-15-21 @ 23:59) (98% - 99%)  Wt(kg): --  Daily     Daily     I&O's Summary    14 Jul 2021 07:01  -  15 Jul 2021 07:00  --------------------------------------------------------  IN: 665 mL / OUT: 1350 mL / NET: -685 mL    15 Jul 2021 07:01  -  16 Jul 2021 05:15  --------------------------------------------------------  IN: 0 mL / OUT: 900 mL / NET: -900 mL      MEDICATIONS  (STANDING):  acetaminophen   Tablet .. 650 milliGRAM(s) Oral every 6 hours  heparin   Injectable 5000 Unit(s) SubCutaneous every 8 hours    MEDICATIONS  (PRN):  oxyCODONE    IR 5 milliGRAM(s) Oral every 4 hours PRN Moderate Pain (4 - 6)  oxyCODONE    IR 10 milliGRAM(s) Oral every 4 hours PRN Severe Pain (7 - 10)      PHYSICAL EXAM (to be evaluated on morning rounds):  GENERAL: NAD, lying in bed comfortably  CHEST: nonlabored, no increased WOB  ABDOMEN: Soft, Nontender, Nondistended. Incision sites clean, dry with no erythema or induration.  EXTREMITIES: Well perfused. No clubbing, cyanosis, or edema  NERVOUS SYSTEM:  Alert & Oriented X3    LABS:                         14.2   13.49 )-----------( 206      ( 15 Jul 2021 06:50 )             44.1     07-15    143  |  103  |  8   ----------------------------<  98  3.4<L>   |  23  |  0.88    Ca    9.3      15 Jul 2021 06:50  Phos  3.7     07-15  Mg     2.10     07-15    TPro  6.7  /  Alb  4.4  /  TBili  0.3  /  DBili  x   /  AST  41<H>  /  ALT  74<H>  /  AlkPhos  59  07-15    PT/INR - ( 14 Jul 2021 12:39 )   PT: 12.4 sec;   INR: 1.09 ratio         PTT - ( 14 Jul 2021 12:39 )  PTT:28.5 sec          RADIOLOGY, EKG & ADDITIONAL TESTS: Reviewed.    Overnight: No acute events.    Subjective: Seen ambulating during AM rounds. Feeling well, pain well controlled. No n/v.     Objective:  Vital Signs (24 Hrs):  T(C): 36.8 (07-15-21 @ 23:59), Max: 37.4 (07-15-21 @ 21:23)  HR: 57 (07-15-21 @ 23:59) (57 - 71)  BP: 148/76 (07-15-21 @ 23:59) (136/77 - 161/70)  RR: 17 (07-15-21 @ 23:59) (17 - 18)  SpO2: 99% (07-15-21 @ 23:59) (98% - 99%)  Wt(kg): --  Daily     Daily     I&O's Summary    14 Jul 2021 07:01  -  15 Jul 2021 07:00  --------------------------------------------------------  IN: 665 mL / OUT: 1350 mL / NET: -685 mL    15 Jul 2021 07:01  -  16 Jul 2021 05:15  --------------------------------------------------------  IN: 0 mL / OUT: 900 mL / NET: -900 mL      MEDICATIONS  (STANDING):  acetaminophen   Tablet .. 650 milliGRAM(s) Oral every 6 hours  heparin   Injectable 5000 Unit(s) SubCutaneous every 8 hours    MEDICATIONS  (PRN):  oxyCODONE    IR 5 milliGRAM(s) Oral every 4 hours PRN Moderate Pain (4 - 6)  oxyCODONE    IR 10 milliGRAM(s) Oral every 4 hours PRN Severe Pain (7 - 10)      PHYSICAL EXAM (to be evaluated on morning rounds):  GENERAL: NAD, lying in bed comfortably  CHEST: nonlabored, no increased WOB  ABDOMEN: Soft, Nontender, Nondistended. Lap sites c/d/i with staples.  EXTREMITIES: Well perfused. No clubbing, cyanosis, or edema  NERVOUS SYSTEM:  Alert & Oriented X3    LABS:                         14.2   13.49 )-----------( 206      ( 15 Jul 2021 06:50 )             44.1     07-15    143  |  103  |  8   ----------------------------<  98  3.4<L>   |  23  |  0.88    Ca    9.3      15 Jul 2021 06:50  Phos  3.7     07-15  Mg     2.10     07-15    TPro  6.7  /  Alb  4.4  /  TBili  0.3  /  DBili  x   /  AST  41<H>  /  ALT  74<H>  /  AlkPhos  59  07-15    PT/INR - ( 14 Jul 2021 12:39 )   PT: 12.4 sec;   INR: 1.09 ratio         PTT - ( 14 Jul 2021 12:39 )  PTT:28.5 sec          RADIOLOGY, EKG & ADDITIONAL TESTS: Reviewed.

## 2021-07-20 LAB — SURGICAL PATHOLOGY STUDY: SIGNIFICANT CHANGE UP

## 2021-08-27 NOTE — ED PROVIDER NOTE - PHYSICAL EXAMINATION
[de-identified] : 8/27/21.  Sinus  Rhythm.  Nonspecific T-abnormality.  [de-identified] : 5/28/21.  Completed 7 minutes of the [place] protocol achieving 87% of MPHR and 8 METS.  No ischemic ECG changes or chest pain with exercise. GENERAL: NAD, lying in bed comfortably  HEAD:  Atraumatic, Normocephalic  EYES: EOMI, PERRLA, conjunctiva and sclera clear  ENT: Moist mucous membranes  NECK: Supple, No JVD  CHEST/LUNG: Clear to auscultation bilaterally; No rales, rhonchi, wheezing, or rubs. Unlabored respirations  HEART: Regular rate and rhythm; No murmurs, rubs, or gallops  ABDOMEN: Bowel sounds present; Soft, tender in b/l upper quadrants, Nondistended. No hepatomegaly  EXTREMITIES:  2+ Peripheral Pulses, brisk capillary refill. No clubbing, cyanosis, or edema  NERVOUS SYSTEM:  Alert & Oriented X3, speech clear. No deficits   MSK: FROM all 4 extremities, full and equal strength  SKIN: No rashes or lesions

## 2021-10-13 NOTE — ED PROVIDER NOTE - SKIN TEMP
Department of Internal Medicine  Nephrology Progress Note      Events reviewed. SUBJECTIVE: We are following Mr. Mehul [address] for NGUYỄN. Reports no complaints.      PHYSICAL EXAM:      Vitals:    VITALS:  BP (!) 118/56   Pulse 80   Temp 97.8 °F (36.6 °C) (Temporal)   Resp 18   Ht 5' 8\" (1.727 m)   Wt 159 lb 9.6 oz (72.4 kg)   SpO2 98%   BMI 24.27 kg/m²   24HR INTAKE/OUTPUT:      Intake/Output Summary (Last 24 hours) at 10/13/2021 0940  Last data filed at 10/13/2021 6362  Gross per 24 hour   Intake 490 ml   Output 1101 ml   Net -611 ml     Constitutional: Patient is awake, alert in no distress  HEENT: Pupils are equal reactive  Respiratory: Decreased breath sounds at the bases  Cardiovascular/Edema: Heart sounds are regular  Gastrointestinal: Abdomen soft  Neurologic: Patient alert   Other: +trace edema      Scheduled Meds:   enoxaparin  30 mg SubCUTAneous Daily    aspirin  81 mg Oral Daily    ferrous sulfate  325 mg Oral BID WC    folic acid  1 mg Oral Daily    vitamin C  500 mg Oral BID    nystatin   Topical BID    clopidogrel  75 mg Oral Daily    bumetanide  1 mg Oral Daily    carvedilol  3.125 mg Oral BID WC    amiodarone  400 mg Oral BID    heparin flush  3 mL IntraVENous 2 times per day    chlorhexidine  15 mL Mouth/Throat BID    atorvastatin  40 mg Oral Daily    ipratropium-albuterol  1 ampule Inhalation Q4H WA     Continuous Infusions:   dextrose      sodium chloride       PRN Meds:.sennosides-docusate sodium, bisacodyl, perflutren lipid microspheres, acetaminophen, oxyCODONE-acetaminophen **OR** oxyCODONE-acetaminophen, diphenhydrAMINE, glucose, dextrose, glucagon (rDNA), dextrose, morphine, ondansetron, sodium chloride, trimethobenzamide, sodium chloride, heparin flush, artificial tears, potassium chloride    DATA:    CBC:   Lab Results   Component Value Date    WBC 10.3 10/13/2021    RBC 2.74 10/13/2021    HGB 7.9 10/13/2021    HCT 24.9 10/13/2021    MCV 90.9 10/13/2021    MCH 28.8 10/13/2021    MCHC 31.7 10/13/2021    RDW 15.0 10/13/2021     10/13/2021    MPV 11.4 10/13/2021     CMP:    Lab Results   Component Value Date     10/13/2021    K 3.4 10/13/2021    K 3.3 10/07/2021     10/13/2021    CO2 21 10/13/2021    BUN 51 10/13/2021    CREATININE 1.9 10/13/2021    GFRAA 42 10/13/2021    LABGLOM 35 10/13/2021    GLUCOSE 95 10/13/2021    PROT 5.4 10/12/2021    LABALBU 3.1 10/12/2021    CALCIUM 8.5 10/13/2021    BILITOT 0.8 10/12/2021    ALKPHOS 47 10/12/2021    AST 15 10/12/2021    ALT 10 10/12/2021     Magnesium:    Lab Results   Component Value Date    MG 2.3 10/10/2021     Phosphorus:    Lab Results   Component Value Date    PHOS 2.8 10/10/2021        Radiology Review:      CXR 10/3/21   1. Median sternotomy changes. 2. Bilateral pleuroparenchymal opacities that could be related to pleural   effusion and edema.  The appearance of the chest is about the same or   slightly worse.         Chest x-ray October 6, 2021   1. Stable appearance of the postoperative chest.   2. Bilateral chest tubes remain in position.  There is no right or left   pneumothorax. 3. Small right pleural effusion         Chest x-ray October 8, 2021   Minimal bibasilar atelectasis.       Trace right pleural effusion       There is no pneumothorax             BRIEF SUMMARY OF INITIAL CONSULT:    Briefly Mr. Yocasta Pathak is a 42-year-old man with history of HTN, CAD with recent status cardiac catheterization done on September 9 which showed severe multivessel disease, hyperlipidemia, hiatal hernia, who was admitted for elective CABG on September 27, 2021. On admission his creatinine level was 0.8 mg/dL and post surgery his creatinine has progressively increased up to 2.3 mg/dL, reason for this consultation. Postoperatively she developed persistent hypotension, lactic acidosis and respiratory failure for which he was reintubated. Since then she has required multiple pressors and he was placed on IABP.  He had received 1 dose of ketorolac perioperatively. His urine output has significantly decrease and is being about 500 cc/day in the last 48 hours and his fluid balance presently is over 9 L. Problems resolved:    · Cardiogenic shock, hemodynamically more stable, pressor support decrease, still on IABP. Tentative plan for removal of IABP. Pro-BP 16,936  · Hypernatremia with hypervolemia, 2/2 sodium containing IV fluid resuscitation and underlying heart failure. Resolved with  natriuresis and free water (add D5W)  · Hypokalemia, 2/2 diuretics, potassium levels improve  · Respiratory alkalosis (pH: 7.554, PCO2: 85.5) with metabolic alkalosis (bicarbonate administration)  · Respiratory failure status post intubation  · Thrombocytopenia  · Transaminitis with hyperbilirubinemia, possibly shock liver versus congestive liver  · Probably pneumonia, on piperacillin-tazobactam    IMPRESSION/RECOMMENDATIONS:      1. NGUYỄN stage III, CABG associated NGUYỄN, ischemic ATN, non-oliguric. FEUrea 18.8%. Renal function continues to improve with decreasing creatinine levels and adequate urine output. 2. HFmEF 40% with stage IDD, proBNP 16,826 > 9461> 7228, on Bumex 1 mg p.o. daily  3. Hypokalemia, 2/2 diuretics, to replace    ---------------------------------------------------------    4. CAD status post CABG x3 September 27, 2021, on ASA, clopidogrel, carvedilol, atorvastatin  5. Amiodarone therapy, for AF prophylaxis  6. Normocytic anemia, status post surgery, status post transfusion, iron saturation 36%, on p.o. iron      Plan:    · Continue Bumex 1 mg p.o. daily  · Replace potassium  · Continue to monitor renal function for recovery   · Okay to discharge to acute rehabilitation unit. warm

## 2023-08-10 NOTE — DISCHARGE NOTE NURSING/CASE MANAGEMENT/SOCIAL WORK - NSDCPETBCESMAN_GEN_ALL_CORE
If you are a smoker, it is important for your health to stop smoking. Please be aware that second hand smoke is also harmful.
37

## 2023-11-09 NOTE — ED PROVIDER NOTE - RESPIRATORY [-], MLM
no difficulty walking/imbalance/no seizures/no change in level of consciousness no cough/no shortness of breath

## 2023-12-01 NOTE — PATIENT PROFILE ADULT - HAS THE PATIENT RECEIVED THE INFLUENZA VACCINE THIS SEASON?
Anesthesia Post Evaluation    Patient: Rosalio Muñoz    Procedure(s) Performed: Procedure(s) (LRB):  EXCISION, LESION, FACE (Right)    Final Anesthesia Type: general      Patient location during evaluation: PACU  Patient participation: Yes- Able to Participate  Level of consciousness: awake and alert and oriented  Post-procedure vital signs: reviewed and stable  Pain management: adequate  Airway patency: patent    PONV status at discharge: No PONV  Anesthetic complications: no      Cardiovascular status: blood pressure returned to baseline, stable and hemodynamically stable  Respiratory status: unassisted  Hydration status: euvolemic  Follow-up not needed.              Vitals Value Taken Time   /90 12/01/23 1100   Temp 36.6 °C (97.8 °F) 12/01/23 1100   Pulse 81 12/01/23 1100   Resp 49 12/01/23 1100   SpO2 97 % 12/01/23 1100   Vitals shown include unvalidated device data.      Event Time   Out of Recovery 10:50:00         Pain/Nick Score: Nick Score: 10 (12/1/2023 11:10 AM)           no...

## 2024-04-27 ENCOUNTER — EMERGENCY (EMERGENCY)
Facility: HOSPITAL | Age: 34
LOS: 1 days | Discharge: HOME | End: 2024-04-27
Attending: EMERGENCY MEDICINE | Admitting: EMERGENCY MEDICINE
Payer: MEDICAID

## 2024-04-27 VITALS
TEMPERATURE: 98 F | SYSTOLIC BLOOD PRESSURE: 140 MMHG | RESPIRATION RATE: 18 BRPM | DIASTOLIC BLOOD PRESSURE: 90 MMHG | HEART RATE: 77 BPM | OXYGEN SATURATION: 100 %

## 2024-04-27 VITALS
TEMPERATURE: 98 F | DIASTOLIC BLOOD PRESSURE: 98 MMHG | OXYGEN SATURATION: 99 % | SYSTOLIC BLOOD PRESSURE: 170 MMHG | RESPIRATION RATE: 18 BRPM | HEART RATE: 106 BPM

## 2024-04-27 DIAGNOSIS — Z98.890 OTHER SPECIFIED POSTPROCEDURAL STATES: Chronic | ICD-10-CM

## 2024-04-27 DIAGNOSIS — F14.90 COCAINE USE, UNSPECIFIED, UNCOMPLICATED: ICD-10-CM

## 2024-04-27 LAB
ALBUMIN SERPL ELPH-MCNC: 4.4 G/DL — SIGNIFICANT CHANGE UP (ref 3.3–5)
ALP SERPL-CCNC: 67 U/L — SIGNIFICANT CHANGE UP (ref 40–120)
ALT FLD-CCNC: 24 U/L — SIGNIFICANT CHANGE UP (ref 4–41)
ANION GAP SERPL CALC-SCNC: 12 MMOL/L — SIGNIFICANT CHANGE UP (ref 7–14)
AST SERPL-CCNC: 22 U/L — SIGNIFICANT CHANGE UP (ref 4–40)
BASOPHILS # BLD AUTO: 0.08 K/UL — SIGNIFICANT CHANGE UP (ref 0–0.2)
BASOPHILS NFR BLD AUTO: 0.5 % — SIGNIFICANT CHANGE UP (ref 0–2)
BILIRUB SERPL-MCNC: <0.2 MG/DL — SIGNIFICANT CHANGE UP (ref 0.2–1.2)
BUN SERPL-MCNC: 13 MG/DL — SIGNIFICANT CHANGE UP (ref 7–23)
CALCIUM SERPL-MCNC: 8.8 MG/DL — SIGNIFICANT CHANGE UP (ref 8.4–10.5)
CHLORIDE SERPL-SCNC: 107 MMOL/L — SIGNIFICANT CHANGE UP (ref 98–107)
CO2 SERPL-SCNC: 22 MMOL/L — SIGNIFICANT CHANGE UP (ref 22–31)
CREAT SERPL-MCNC: 0.84 MG/DL — SIGNIFICANT CHANGE UP (ref 0.5–1.3)
EGFR: 117 ML/MIN/1.73M2 — SIGNIFICANT CHANGE UP
EOSINOPHIL # BLD AUTO: 0.02 K/UL — SIGNIFICANT CHANGE UP (ref 0–0.5)
EOSINOPHIL NFR BLD AUTO: 0.1 % — SIGNIFICANT CHANGE UP (ref 0–6)
GLUCOSE SERPL-MCNC: 118 MG/DL — HIGH (ref 70–99)
HCT VFR BLD CALC: 44.1 % — SIGNIFICANT CHANGE UP (ref 39–50)
HGB BLD-MCNC: 14.5 G/DL — SIGNIFICANT CHANGE UP (ref 13–17)
HIV 1+2 AB+HIV1 P24 AG SERPL QL IA: SIGNIFICANT CHANGE UP
IANC: 13.93 K/UL — HIGH (ref 1.8–7.4)
IMM GRANULOCYTES NFR BLD AUTO: 0.6 % — SIGNIFICANT CHANGE UP (ref 0–0.9)
LYMPHOCYTES # BLD AUTO: 1.25 K/UL — SIGNIFICANT CHANGE UP (ref 1–3.3)
LYMPHOCYTES # BLD AUTO: 7.4 % — LOW (ref 13–44)
MAGNESIUM SERPL-MCNC: 2.1 MG/DL — SIGNIFICANT CHANGE UP (ref 1.6–2.6)
MCHC RBC-ENTMCNC: 29.2 PG — SIGNIFICANT CHANGE UP (ref 27–34)
MCHC RBC-ENTMCNC: 32.9 GM/DL — SIGNIFICANT CHANGE UP (ref 32–36)
MCV RBC AUTO: 88.9 FL — SIGNIFICANT CHANGE UP (ref 80–100)
MONOCYTES # BLD AUTO: 1.56 K/UL — HIGH (ref 0–0.9)
MONOCYTES NFR BLD AUTO: 9.2 % — SIGNIFICANT CHANGE UP (ref 2–14)
NEUTROPHILS # BLD AUTO: 13.93 K/UL — HIGH (ref 1.8–7.4)
NEUTROPHILS NFR BLD AUTO: 82.2 % — HIGH (ref 43–77)
NRBC # BLD: 0 /100 WBCS — SIGNIFICANT CHANGE UP (ref 0–0)
NRBC # FLD: 0 K/UL — SIGNIFICANT CHANGE UP (ref 0–0)
PLATELET # BLD AUTO: 239 K/UL — SIGNIFICANT CHANGE UP (ref 150–400)
POTASSIUM SERPL-MCNC: 3.9 MMOL/L — SIGNIFICANT CHANGE UP (ref 3.5–5.3)
POTASSIUM SERPL-SCNC: 3.9 MMOL/L — SIGNIFICANT CHANGE UP (ref 3.5–5.3)
PROT SERPL-MCNC: 7 G/DL — SIGNIFICANT CHANGE UP (ref 6–8.3)
RBC # BLD: 4.96 M/UL — SIGNIFICANT CHANGE UP (ref 4.2–5.8)
RBC # FLD: 12.7 % — SIGNIFICANT CHANGE UP (ref 10.3–14.5)
SODIUM SERPL-SCNC: 141 MMOL/L — SIGNIFICANT CHANGE UP (ref 135–145)
TROPONIN T, HIGH SENSITIVITY RESULT: 9 NG/L — SIGNIFICANT CHANGE UP
TROPONIN T, HIGH SENSITIVITY RESULT: 9 NG/L — SIGNIFICANT CHANGE UP
TSH SERPL-MCNC: 0.69 UIU/ML — SIGNIFICANT CHANGE UP (ref 0.27–4.2)
WBC # BLD: 16.94 K/UL — HIGH (ref 3.8–10.5)
WBC # FLD AUTO: 16.94 K/UL — HIGH (ref 3.8–10.5)

## 2024-04-27 PROCEDURE — 99285 EMERGENCY DEPT VISIT HI MDM: CPT

## 2024-04-27 PROCEDURE — 71046 X-RAY EXAM CHEST 2 VIEWS: CPT | Mod: 26

## 2024-04-27 PROCEDURE — 93010 ELECTROCARDIOGRAM REPORT: CPT

## 2024-04-27 RX ORDER — SODIUM CHLORIDE 9 MG/ML
1000 INJECTION INTRAMUSCULAR; INTRAVENOUS; SUBCUTANEOUS ONCE
Refills: 0 | Status: COMPLETED | OUTPATIENT
Start: 2024-04-27 | End: 2024-04-27

## 2024-04-27 NOTE — ED ADULT NURSE NOTE - OBJECTIVE STATEMENT
Patient presents to ED for palpitations. He is A&Ox4, in no acute distress, calm, cooperative. Ambulates with steady gait. Patient states he did cocaine and smoked marijuana last night, last use 5 AM. C/O occasional dizziness, nausea, no vomiting. Denies SOB, dyspnea, fevers, chills. Respirations even, unlabored on room air. No pallor, no cyanosis. Patient refuses peripheral IV. MD Bagley made aware. Labs drawn by butterfly needle. On cardiac monitor showing NSR.

## 2024-04-27 NOTE — CONSULT NOTE ADULT - ASSESSMENT
Patient is a 34-year-old male no past medical history presenting to the emergency department with concern for chest pain and palpitations.  Earlier this morning at a social gathering he said he had some cannabis and insufflated 2 lines of what he was told was cocaine.  He developed the symptoms afterward.  Symptoms have since resolved without any intervention.  Patient is well-appearing, no acute toxidrome on physical examination.  EKG with nonspecific T wave abnormalities.  No evidence of sodium channel blockade.  Agree with primary team's chest pain workup.  Counseled patient extensively on recreational xenobiotic use and risks regarding opioids and cocaine usage.  Patient does admit to regular cannabis use.  Says he has occasional alcohol consumption.  At this time patient does not appear to have a cocaine use disorder.  Patient verbalized understanding of risks of recreational xenobiotic consumption.

## 2024-04-27 NOTE — ED ADULT NURSE NOTE - NSFALLUNIVINTERV_ED_ALL_ED
Bed/Stretcher in lowest position, wheels locked, appropriate side rails in place/Call bell, personal items and telephone in reach/Instruct patient to call for assistance before getting out of bed/chair/stretcher/Non-slip footwear applied when patient is off stretcher/[place] to call system/Physically safe environment - no spills, clutter or unnecessary equipment/Purposeful proactive rounding/Room/bathroom lighting operational, light cord in reach

## 2024-04-27 NOTE — ED PROVIDER NOTE - PATIENT PORTAL LINK FT
You can access the FollowMyHealth Patient Portal offered by [facility] by registering at the following website: [email or web address]. By joining [employer]’s FollowMyHealth portal, you will also be able to view your health information using other applications (apps) compatible with our system.

## 2024-04-27 NOTE — CONSULT NOTE ADULT - PROBLEM SELECTOR RECOMMENDATION 9
-supportive care  -agree with chest pain workup  -substance use counseling performed  -rest of care per primary team

## 2024-04-27 NOTE — CONSULT NOTE ADULT - SUBJECTIVE AND OBJECTIVE BOX
see assessment for hpi    ICU Vital Signs Last 24 Hrs  T(C): 36.9 (27 Apr 2024 08:32), Max: 36.9 (27 Apr 2024 08:32)  T(F): 98.5 (27 Apr 2024 08:32), Max: 98.5 (27 Apr 2024 08:32)  HR: 106 (27 Apr 2024 08:32) (106 - 106)  BP: 170/98 (27 Apr 2024 08:32) (170/98 - 170/98)  BP(mean): --  ABP: --  ABP(mean): --  RR: 18 (27 Apr 2024 08:32) (18 - 18)  SpO2: 99% (27 Apr 2024 08:32) (99% - 99%)    O2 Parameters below as of 27 Apr 2024 08:32  Patient On (Oxygen Delivery Method): room air    CONSTITUTIONAL: Non-toxic, non-diaphoretic, in no apparent distress  HEAD: Normocephalic; atraumatic  EYES: EOM intact , pupils normal for room   ENMT: External appears normal; normal oropharynx, moist  NECK: grossly normal active ROM,  CARD: No cyanosis, good peripheral perfusion, RRR  RESP: Normal chest excursion with respiration; no increased work of breathing  ABD: non-distended   EXT: no gross trauma  SKIN: Warm, dry, no rash  NEURO:  moving all extremities, no facial droop, no dysarthria      cn2-12 intact  5/5 all extremities

## 2024-04-27 NOTE — ED PROVIDER NOTE - CLINICAL SUMMARY MEDICAL DECISION MAKING FREE TEXT BOX
35 y/o male with no significant PMHx who presented to the ED for palpitations today.   Concern for Cocaine use and reaction leading to tachycardia/Arrythmia/ACS  Will obtain labs, EKG, and CXR along with tele monitoring.

## 2024-04-27 NOTE — ED PROVIDER NOTE - OBJECTIVE STATEMENT
33 y/o male with no significant PMHx who presented to the ED for palpitations today. Pt states around 2am-5am he ingested cocaine along with marijuana use. Pt notes since having palpitations. Pt denies any fever, chills, nausea, vomiting, SOB, or abd pain.

## 2024-04-27 NOTE — ED PROVIDER NOTE - NSFOLLOWUPINSTRUCTIONS_ED_ALL_ED_FT
YOU WERE SEEN FOR PALPITATIONS    YOU HAD LABS AND IMAGING DONE    YOU HAVE BEEN DIAGNOSED WITH PALPITATIONS    REST AND PLENTY OF FLUIDS    AVOID ILLICIT DRUG USE    FOLLOW UP WITH YOUR PRIMARY CARE PROVIDER WITHIN 1 WEEK.    RETURN TO THE EMERGENCY DEPARTMENT FOR PALPITATIONS, SHORTNESS OF BREATH, CHEST PAIN, OR FOR ANY WORSENING SYMPTOMS.

## 2024-04-29 NOTE — ED POST DISCHARGE NOTE - RESULT SUMMARY
Patient called as he received a missed call. Unclear nature of call. Patient notes he is feeling well, no complaints. Back to work and will follow up with PMD for rpt blood work
